# Patient Record
Sex: MALE | Race: WHITE | NOT HISPANIC OR LATINO | URBAN - METROPOLITAN AREA
[De-identification: names, ages, dates, MRNs, and addresses within clinical notes are randomized per-mention and may not be internally consistent; named-entity substitution may affect disease eponyms.]

---

## 2018-04-29 ENCOUNTER — EMERGENCY (EMERGENCY)
Facility: HOSPITAL | Age: 43
LOS: 1 days | Discharge: HOME | End: 2018-04-29
Admitting: EMERGENCY MEDICINE
Payer: OTHER MISCELLANEOUS

## 2018-04-29 VITALS
OXYGEN SATURATION: 96 % | RESPIRATION RATE: 16 BRPM | HEART RATE: 93 BPM | SYSTOLIC BLOOD PRESSURE: 121 MMHG | DIASTOLIC BLOOD PRESSURE: 85 MMHG | TEMPERATURE: 98 F

## 2018-04-29 DIAGNOSIS — E78.5 HYPERLIPIDEMIA, UNSPECIFIED: ICD-10-CM
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
## 2018-04-29 DIAGNOSIS — I10 ESSENTIAL (PRIMARY) HYPERTENSION: ICD-10-CM
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
## 2018-04-29 DIAGNOSIS — S83.91XA SPRAIN OF UNSPECIFIED SITE OF RIGHT KNEE, INITIAL ENCOUNTER: ICD-10-CM

## 2018-04-29 DIAGNOSIS — S83.92XA SPRAIN OF UNSPECIFIED SITE OF LEFT KNEE, INITIAL ENCOUNTER: ICD-10-CM
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
## 2018-04-29 DIAGNOSIS — J45.909 UNSPECIFIED ASTHMA, UNCOMPLICATED: ICD-10-CM

## 2018-04-29 DIAGNOSIS — M25.561 PAIN IN RIGHT KNEE: ICD-10-CM

## 2018-04-29 DIAGNOSIS — Z98.890 OTHER SPECIFIED POSTPROCEDURAL STATES: Chronic | ICD-10-CM

## 2018-04-29 PROCEDURE — 99283 EMERGENCY DEPT VISIT LOW MDM: CPT | Mod: 25

## 2018-04-29 PROCEDURE — 73562 X-RAY EXAM OF KNEE 3: CPT | Mod: 26,50

## 2018-04-29 RX ORDER — IBUPROFEN 200 MG
1 TABLET ORAL
Qty: 20 | Refills: 0
Start: 2018-04-29 | End: 2018-05-28

## 2018-04-29 RX ORDER — IBUPROFEN 200 MG
800 TABLET ORAL ONCE
Refills: 0 | Status: COMPLETED | OUTPATIENT
Start: 2018-04-29 | End: 2018-04-29

## 2018-04-29 RX ORDER — OXYCODONE AND ACETAMINOPHEN 5; 325 MG/1; MG/1
1 TABLET ORAL ONCE
Refills: 0 | Status: DISCONTINUED | OUTPATIENT
Start: 2018-04-29 | End: 2018-04-29

## 2018-04-29 RX ADMIN — OXYCODONE AND ACETAMINOPHEN 1 TABLET(S): 5; 325 TABLET ORAL at 19:25

## 2018-04-29 RX ADMIN — Medication 800 MILLIGRAM(S): at 18:52

## 2018-04-29 NOTE — ED PROVIDER NOTE - DIAGNOSTIC INTERPRETATION
Xray (wet reads) interpreted by LYNSEY RECINOS   Xray knees 3 views - +soft tissue swelling. no acute fx or dislocation, joint space intact, no effusion noted. No foreign body noted Xray (wet reads) interpreted by LYNSEY RECINOS   Xray knees 3 views - +soft tissue swelling with slight uneven displacement of the patella on the R. no acute fx or dislocation, joint space intact, no effusion noted. No foreign body noted

## 2018-04-29 NOTE — ED PROVIDER NOTE - PHYSICAL EXAMINATION
Gen - WDWN, NAD, comfortable and non-toxic appearing  Skin - warm, dry, intact   CV - S1S2, R/R/R  Resp - CTAB, no r/r/w   MS - w/w/p, R knee + minimal edema and TTP medially. L knee with TTP over the patellar and medial region, no erythema, ecchymosis, crepitus, joint laxity, or deformity, restricted ROM 2/2 pain, NV intact. no midline tenderness, pelvis and hips stable and NT   Neuro - AxOx3, ambulatory with mild limp

## 2018-04-29 NOTE — ED PROVIDER NOTE - OBJECTIVE STATEMENT
42 yo M with PMHx of HTN, HLD, GERD, kidney stones, and asthma presenting c/o b/l knee pain s/p work injury.  Pt is coming down on a train track, slipped and banged his L knee against the pole and twisted R knee.  Now with pain in both knees and difficulty with ambulation.  Denies head trauma, LOC, break in the skin, paresthesia, numbness, tingling, redness, bleeding, d/c, HA, dizziness, SOB, CP, palpitations, N/V, focal weakness, neck/back pain, and malaise. Pt is ambulatory s/p fall

## 2020-01-16 ENCOUNTER — EMERGENCY (EMERGENCY)
Facility: HOSPITAL | Age: 45
LOS: 1 days | Discharge: HOME | End: 2020-01-16
Attending: EMERGENCY MEDICINE | Admitting: EMERGENCY MEDICINE
Payer: COMMERCIAL

## 2020-01-16 VITALS
TEMPERATURE: 98 F | RESPIRATION RATE: 20 BRPM | SYSTOLIC BLOOD PRESSURE: 112 MMHG | HEART RATE: 82 BPM | OXYGEN SATURATION: 97 % | DIASTOLIC BLOOD PRESSURE: 72 MMHG

## 2020-01-16 VITALS
WEIGHT: 289.91 LBS | HEIGHT: 71 IN | SYSTOLIC BLOOD PRESSURE: 140 MMHG | RESPIRATION RATE: 20 BRPM | OXYGEN SATURATION: 97 % | HEART RATE: 103 BPM | TEMPERATURE: 98 F | DIASTOLIC BLOOD PRESSURE: 90 MMHG

## 2020-01-16 DIAGNOSIS — Z98.890 OTHER SPECIFIED POSTPROCEDURAL STATES: Chronic | ICD-10-CM

## 2020-01-16 LAB
ALBUMIN SERPL ELPH-MCNC: 3.6 G/DL — SIGNIFICANT CHANGE UP (ref 3.4–5)
ALP SERPL-CCNC: 99 U/L — SIGNIFICANT CHANGE UP (ref 40–120)
ALT FLD-CCNC: 25 U/L — SIGNIFICANT CHANGE UP (ref 12–42)
AMPHET UR-MCNC: NEGATIVE — SIGNIFICANT CHANGE UP
ANION GAP SERPL CALC-SCNC: 6 MMOL/L — LOW (ref 9–16)
APPEARANCE UR: CLEAR — SIGNIFICANT CHANGE UP
APTT BLD: 33.8 SEC — SIGNIFICANT CHANGE UP (ref 27.5–36.3)
AST SERPL-CCNC: 19 U/L — SIGNIFICANT CHANGE UP (ref 15–37)
BARBITURATES UR SCN-MCNC: POSITIVE — SIGNIFICANT CHANGE UP
BASOPHILS # BLD AUTO: 0.08 K/UL — SIGNIFICANT CHANGE UP (ref 0–0.2)
BASOPHILS NFR BLD AUTO: 1.1 % — SIGNIFICANT CHANGE UP (ref 0–2)
BENZODIAZ UR-MCNC: POSITIVE
BILIRUB SERPL-MCNC: 0.3 MG/DL — SIGNIFICANT CHANGE UP (ref 0.2–1.2)
BILIRUB UR-MCNC: NEGATIVE — SIGNIFICANT CHANGE UP
BUN SERPL-MCNC: 28 MG/DL — HIGH (ref 7–23)
CALCIUM SERPL-MCNC: 8.6 MG/DL — SIGNIFICANT CHANGE UP (ref 8.5–10.5)
CHLORIDE SERPL-SCNC: 106 MMOL/L — SIGNIFICANT CHANGE UP (ref 96–108)
CO2 SERPL-SCNC: 28 MMOL/L — SIGNIFICANT CHANGE UP (ref 22–31)
COCAINE METAB.OTHER UR-MCNC: POSITIVE
COLOR SPEC: YELLOW — SIGNIFICANT CHANGE UP
CREAT SERPL-MCNC: 1.01 MG/DL — SIGNIFICANT CHANGE UP (ref 0.5–1.3)
D DIMER BLD IA.RAPID-MCNC: <187 NG/ML DDU — SIGNIFICANT CHANGE UP
DIFF PNL FLD: NEGATIVE — SIGNIFICANT CHANGE UP
EOSINOPHIL # BLD AUTO: 0.36 K/UL — SIGNIFICANT CHANGE UP (ref 0–0.5)
EOSINOPHIL NFR BLD AUTO: 5.2 % — SIGNIFICANT CHANGE UP (ref 0–6)
GLUCOSE SERPL-MCNC: 97 MG/DL — SIGNIFICANT CHANGE UP (ref 70–99)
GLUCOSE UR QL: NEGATIVE — SIGNIFICANT CHANGE UP
HCT VFR BLD CALC: 42.8 % — SIGNIFICANT CHANGE UP (ref 39–50)
HGB BLD-MCNC: 14.3 G/DL — SIGNIFICANT CHANGE UP (ref 13–17)
IMM GRANULOCYTES NFR BLD AUTO: 0.3 % — SIGNIFICANT CHANGE UP (ref 0–1.5)
INR BLD: 1.01 — SIGNIFICANT CHANGE UP (ref 0.88–1.16)
KETONES UR-MCNC: ABNORMAL MG/DL
LEUKOCYTE ESTERASE UR-ACNC: NEGATIVE — SIGNIFICANT CHANGE UP
LIDOCAIN IGE QN: 72 U/L — LOW (ref 73–393)
LYMPHOCYTES # BLD AUTO: 1.2 K/UL — SIGNIFICANT CHANGE UP (ref 1–3.3)
LYMPHOCYTES # BLD AUTO: 17.2 % — SIGNIFICANT CHANGE UP (ref 13–44)
MCHC RBC-ENTMCNC: 29.5 PG — SIGNIFICANT CHANGE UP (ref 27–34)
MCHC RBC-ENTMCNC: 33.4 GM/DL — SIGNIFICANT CHANGE UP (ref 32–36)
MCV RBC AUTO: 88.2 FL — SIGNIFICANT CHANGE UP (ref 80–100)
METHADONE UR-MCNC: NEGATIVE — SIGNIFICANT CHANGE UP
MONOCYTES # BLD AUTO: 0.71 K/UL — SIGNIFICANT CHANGE UP (ref 0–0.9)
MONOCYTES NFR BLD AUTO: 10.2 % — SIGNIFICANT CHANGE UP (ref 2–14)
NEUTROPHILS # BLD AUTO: 4.6 K/UL — SIGNIFICANT CHANGE UP (ref 1.8–7.4)
NEUTROPHILS NFR BLD AUTO: 66 % — SIGNIFICANT CHANGE UP (ref 43–77)
NITRITE UR-MCNC: NEGATIVE — SIGNIFICANT CHANGE UP
NRBC # BLD: 0 /100 WBCS — SIGNIFICANT CHANGE UP (ref 0–0)
NT-PROBNP SERPL-SCNC: 48 PG/ML — SIGNIFICANT CHANGE UP
OPIATES UR-MCNC: NEGATIVE — SIGNIFICANT CHANGE UP
PCP SPEC-MCNC: SIGNIFICANT CHANGE UP
PCP UR-MCNC: NEGATIVE — SIGNIFICANT CHANGE UP
PH UR: 7.5 — SIGNIFICANT CHANGE UP (ref 5–8)
PLATELET # BLD AUTO: 205 K/UL — SIGNIFICANT CHANGE UP (ref 150–400)
POTASSIUM SERPL-MCNC: 4.1 MMOL/L — SIGNIFICANT CHANGE UP (ref 3.5–5.3)
POTASSIUM SERPL-SCNC: 4.1 MMOL/L — SIGNIFICANT CHANGE UP (ref 3.5–5.3)
PROT SERPL-MCNC: 6.6 G/DL — SIGNIFICANT CHANGE UP (ref 6.4–8.2)
PROT UR-MCNC: NEGATIVE MG/DL — SIGNIFICANT CHANGE UP
PROTHROM AB SERPL-ACNC: 11.2 SEC — SIGNIFICANT CHANGE UP (ref 10–12.9)
RBC # BLD: 4.85 M/UL — SIGNIFICANT CHANGE UP (ref 4.2–5.8)
RBC # FLD: 13.2 % — SIGNIFICANT CHANGE UP (ref 10.3–14.5)
SODIUM SERPL-SCNC: 140 MMOL/L — SIGNIFICANT CHANGE UP (ref 132–145)
SP GR SPEC: 1.02 — SIGNIFICANT CHANGE UP (ref 1–1.03)
THC UR QL: NEGATIVE — SIGNIFICANT CHANGE UP
TROPONIN I SERPL-MCNC: <0.017 NG/ML — LOW (ref 0.02–0.06)
TROPONIN I SERPL-MCNC: <0.017 NG/ML — LOW (ref 0.02–0.06)
UROBILINOGEN FLD QL: 0.2 E.U./DL — SIGNIFICANT CHANGE UP
WBC # BLD: 6.97 K/UL — SIGNIFICANT CHANGE UP (ref 3.8–10.5)
WBC # FLD AUTO: 6.97 K/UL — SIGNIFICANT CHANGE UP (ref 3.8–10.5)

## 2020-01-16 PROCEDURE — 99285 EMERGENCY DEPT VISIT HI MDM: CPT | Mod: 25

## 2020-01-16 PROCEDURE — 71046 X-RAY EXAM CHEST 2 VIEWS: CPT | Mod: 26

## 2020-01-16 PROCEDURE — 99284 EMERGENCY DEPT VISIT MOD MDM: CPT

## 2020-01-16 PROCEDURE — 93010 ELECTROCARDIOGRAM REPORT: CPT

## 2020-01-16 RX ORDER — ASPIRIN/CALCIUM CARB/MAGNESIUM 324 MG
162 TABLET ORAL ONCE
Refills: 0 | Status: DISCONTINUED | OUTPATIENT
Start: 2020-01-16 | End: 2020-01-16

## 2020-01-16 RX ORDER — ASPIRIN/CALCIUM CARB/MAGNESIUM 324 MG
162 TABLET ORAL ONCE
Refills: 0 | Status: COMPLETED | OUTPATIENT
Start: 2020-01-16 | End: 2020-01-16

## 2020-01-16 RX ADMIN — Medication 162 MILLIGRAM(S): at 10:33

## 2020-01-16 NOTE — ED PROVIDER NOTE - OBJECTIVE STATEMENT
44 y o male with PMHX of asthma (Singulair, Allegra, Flovent rescue inhaler), HLD (Pravastatin), non smoker, presents to ED c/o two episodes of non radiating substernal chest pain and flushed sensation this morning. The first episode happened while sitting on the bus during his commute at 5:30 am, and resolved about 45 minutes later. The second episode happened after eating breakfast at work and resolved about 15 minutes later, with associated nausea. Chest pressure described as "if there was an elephant sitting on my chest" with associated SOB noted with heavy breathing. States he took a baby Aspirin PTA. Pt denies current chest pain or SOB. States that similar periods of disrupted breathing have woken him up in the past. Pt has hx of ENT surgery for sleep apnea in 2011. Father has hx of MI at 42 y o. No vomiting, palpitations, syncope, abd pain, back pain, edema, or other complaints.

## 2020-01-16 NOTE — ED PROVIDER NOTE - PROGRESS NOTE DETAILS
initial workup is unremarkable including Dimer and troponin. Will hold for second troponin, cardiac monitoring, and consult cardiology. normal trop x 2, normal EKG, asymptomatic in ED. Will DC w Cardio f/u.

## 2020-01-16 NOTE — CONSULT NOTE ADULT - SUBJECTIVE AND OBJECTIVE BOX
[facility] Cardiology Consultation    CHIEF COMPLAINT: CP    HISTORY OF PRESENT ILLNESS: 43 y/o male with history of HLD and KYLIE s/p surgery presented with chest discomfort and fatigue noted this morning. He remarks on symptoms at rest and 45 min or so in duration. Patient hypertensive on arrival.     PAST MEDICAL & SURGICAL HISTORY:  Asthma  GERD (gastroesophageal reflux disease)  HLD (hyperlipidemia)  HTN (hypertension)  H/O hernia repair        Allergies Percocet 10/325 (Unknown)    MEDICATIONS:  pravastatin    FAMILY HISTORY: no CAD    SOCIAL HISTORY:  no EtOH, tobacco or drug use    REVIEW OF SYSTEMS:  CONSTITUTIONAL: No fever, weight loss, or fatigue  EYES: No eye pain, visual disturbances, or discharge  ENMT:  No difficulty hearing, tinnitus, vertigo; No sinus or throat pain  NECK: No pain or stiffness  BREASTS: No pain, masses, or nipple discharge  RESPIRATORY: No cough, wheezing, chills or hemoptysis; No Shortness of Breath  CARDIOVASCULAR: No chest pain, palpitations, dizziness, or leg swelling  GASTROINTESTINAL: No abdominal or epigastric pain. No nausea, vomiting, or hematemesis; No diarrhea or constipation. No melena or hematochezia.  GENITOURINARY: No dysuria, frequency, hematuria, or incontinence  NEUROLOGICAL: No headaches, memory loss, loss of strength, numbness, or tremors  SKIN: No itching, burning, rashes, or lesions   LYMPH Nodes: No enlarged glands  ENDOCRINE: No heat or cold intolerance; No hair loss  MUSCULOSKELETAL: No joint pain or swelling; No muscle, back, or extremity pain  PSYCHIATRIC: No depression, anxiety, mood swings, or difficulty sleeping  HEME/LYMPH: No easy bruising, or bleeding gums  ALLERY AND IMMUNOLOGIC: No hives or eczema	      PHYSICAL EXAM:  T(C): 36.8 (01-16-20 @ 13:49), Max: 36.8 (01-16-20 @ 13:49)  HR: 82 (01-16-20 @ 13:49) (82 - 103)  BP: 112/72 (01-16-20 @ 13:49) (112/72 - 147/73)  RR: 20 (01-16-20 @ 13:49) (18 - 20)  SpO2: 97% (01-16-20 @ 13:49) (96% - 97%)  Appearance: n  HEENT:   Normal oral mucosa, PERRL, EOMI	  Lymphatic: No lymphadenopathy  Cardiovascular: Normal S1 S2, No JVD, No murmurs, No edema  Respiratory: Lungs clear to auscultation	  Psychiatry: A & O x 3, Mood & affect appropriate  Gastrointestinal:  Soft, Non-tender, + BS	  Skin: No rashes, No ecchymoses, No cyanosis	  Neurologic: Non-focal  Extremities: Normal range of motion, No clubbing, cyanosis or edema  Vascular: Peripheral pulses palpable 2+ bilaterally  	    ECG:  	sr no st-t changes     LABS:	 	  CARDIAC MARKERS:  Troponin I, Serum: <0.017 ng/mL (01-16 @ 10:31)                                  14.3   6.97  )-----------( 205      ( 16 Jan 2020 10:31 )             42.8     01-16    140  |  106  |  28<H>  ----------------------------<  97  4.1   |  28  |  1.01    Ca    8.6      16 Jan 2020 10:31    TPro  6.6  /  Alb  3.6  /  TBili  0.3  /  DBili  x   /  AST  19  /  ALT  25  /  AlkPhos  99  01-16    proBNP: Serum Pro-Brain Natriuretic Peptide: 48 pg/mL (01-16 @ 10:31)    ASSESSMENT/PLAN: 	43 y/o man with chest discomfort  1. patient seen and examined, chart reviewed  2. CE x 2  3. monitor b/p  4. will see in the office for a follow up and a possible echo  5. patient can also benefit from pulm medicine follow up    I spent 45 min in care of this patient

## 2020-01-16 NOTE — ED PROVIDER NOTE - CLINICAL SUMMARY MEDICAL DECISION MAKING FREE TEXT BOX
Complete full aspirin dose. Lab work ordered including cardiac enzymes. CXR and EKG ordered. Several risk factors taken into consideration including PMHx and FHx. Reassess.

## 2020-01-16 NOTE — ED PROVIDER NOTE - PATIENT PORTAL LINK FT
You can access the FollowMyHealth Patient Portal offered by [facility] by registering at the following website: [email or web address]. By joining [facility]’s FollowMyHealth portal, you will also be able to view your health information using other applications (apps) compatible with our system.

## 2020-01-16 NOTE — ED PROVIDER NOTE - DIAGNOSTIC INTERPRETATION
Interpreted by ED physician: DR. العراقي  Chest x-ray, 2 views  Lungs clear, heart shadow normal, bony structures normal, no free air under diaphragm, no PTX

## 2020-01-16 NOTE — ED PROVIDER NOTE - CARE PROVIDER_API CALL
Ugo Ortez)  Cardiovascular Disease  [address] [place] [address]  Phone: [phone]  Fax: [phone]  Follow Up Time:

## 2020-01-21 PROBLEM — Z00.00 ENCOUNTER FOR PREVENTIVE HEALTH EXAMINATION: Status: ACTIVE | Noted: 2020-01-21

## 2020-01-21 PROBLEM — Z00.00 ENCOUNTER FOR PREVENTIVE HEALTH EXAMINATION: Noted: 2020-01-21

## 2020-01-22 ENCOUNTER — APPOINTMENT (OUTPATIENT)
Dept: HEART AND VASCULAR | Facility: CLINIC | Age: 45
End: 2020-01-22
Payer: COMMERCIAL

## 2020-01-22 ENCOUNTER — APPOINTMENT (OUTPATIENT)
Dept: OTOLARYNGOLOGY | Facility: CLINIC | Age: 45
End: 2020-01-22
Payer: COMMERCIAL

## 2020-01-22 VITALS
BODY MASS INDEX: 41.44 KG/M2 | DIASTOLIC BLOOD PRESSURE: 85 MMHG | HEIGHT: 71 IN | WEIGHT: 296 LBS | TEMPERATURE: 99 F | OXYGEN SATURATION: 95 % | HEART RATE: 83 BPM | SYSTOLIC BLOOD PRESSURE: 123 MMHG

## 2020-01-22 VITALS
SYSTOLIC BLOOD PRESSURE: 120 MMHG | HEIGHT: 71 IN | DIASTOLIC BLOOD PRESSURE: 66 MMHG | BODY MASS INDEX: 41.44 KG/M2 | OXYGEN SATURATION: 96 % | HEART RATE: 85 BPM | WEIGHT: 296 LBS

## 2020-01-22 DIAGNOSIS — Z85.9 PERSONAL HISTORY OF MALIGNANT NEOPLASM, UNSPECIFIED: ICD-10-CM

## 2020-01-22 PROCEDURE — 99214 OFFICE O/P EST MOD 30 MIN: CPT

## 2020-01-22 PROCEDURE — 31575 DIAGNOSTIC LARYNGOSCOPY: CPT

## 2020-01-22 PROCEDURE — 99204 OFFICE O/P NEW MOD 45 MIN: CPT | Mod: 25

## 2020-01-22 NOTE — ASSESSMENT
[FreeTextEntry1] : Reviewed reflux precautions and provided the patient with the corresponding educational handout.\par Discussed allergen mitigation and provided the patient with the corresponding educational handout; reviewed proper nasal steroid administration technique.\par Discussed a possible turbinoplasty; discussed a bariatrics referral.

## 2020-01-22 NOTE — HISTORY OF PRESENT ILLNESS
[de-identified] : s/p TORS (UPPP, septo) w/ Dr Ibarra in 2012 for KYLIE; he improved but didn’t have a postop exam. Now presents c/o ongoing snoring & nocturnal awakenings w/ lots of daytime sleepiness. No morning headaches, rx for htn or nasal dyspnea in general. \par Multiple allergies to dust, pollen, cats, and others. Takes allegra & singulair. Not sure if he's tried nasal steroids. Not on a steroid for his asthma. \par Lots of reflux for which he takes protonix daily. \par

## 2020-01-22 NOTE — PROCEDURE
[de-identified] : Indication: requirement for exam not possible via anterior examination; KYLIE workup\par After verbal consent and the administration of an aerosolized phenylephrine/lidocaine mix, examination was performed with a flexible endoscope placed through the nose. Findings:\par Nasopharynx: unremarkable\par Soft palate, lateral and posterior pharyngeal walls: partially absent uvula; prominent narrowing of the lateral walls\par Base of tongue & lingual tonsil: minimal retrodisplacement\par Vallecula: unremarkable\par Epiglottis: unremarkable\par Piriform sinuses and pharyngoesophageal junction: unremarkable\par Arytenoids and AE folds: mod-severe interarytenoid edema\par Ventricle and false vocal folds: unremarkable\par True vocal folds: Smooth free edge; surface without ectasias or edema; normal movement bilaterally with good apposition in phonation\par Visible subglottis: unremarkable\par Other findings: none

## 2020-01-23 DIAGNOSIS — R07.89 OTHER CHEST PAIN: ICD-10-CM

## 2020-01-23 DIAGNOSIS — R06.02 SHORTNESS OF BREATH: ICD-10-CM

## 2020-01-23 RX ORDER — PANTOPRAZOLE SODIUM 40 MG/1
40 TABLET, DELAYED RELEASE ORAL
Refills: 0 | Status: ACTIVE | COMMUNITY

## 2020-01-23 RX ORDER — PRAVASTATIN SODIUM 10 MG/1
10 TABLET ORAL
Refills: 0 | Status: ACTIVE | COMMUNITY

## 2020-01-23 RX ORDER — MONTELUKAST SODIUM 10 MG/1
10 TABLET, FILM COATED ORAL
Refills: 0 | Status: ACTIVE | COMMUNITY

## 2020-01-23 NOTE — PHYSICAL EXAM
[General Appearance - Well Developed] : well developed [Normal Appearance] : normal appearance [Well Groomed] : well groomed [General Appearance - Well Nourished] : well nourished [No Deformities] : no deformities [General Appearance - In No Acute Distress] : no acute distress [Normal Conjunctiva] : the conjunctiva exhibited no abnormalities [Eyelids - No Xanthelasma] : the eyelids demonstrated no xanthelasmas [Normal Oral Mucosa] : normal oral mucosa [No Oral Pallor] : no oral pallor [No Oral Cyanosis] : no oral cyanosis [Normal Jugular Venous A Waves Present] : normal jugular venous A waves present [Normal Jugular Venous V Waves Present] : normal jugular venous V waves present [No Jugular Venous Linton A Waves] : no jugular venous linton A waves [Respiration, Rhythm And Depth] : normal respiratory rhythm and effort [Exaggerated Use Of Accessory Muscles For Inspiration] : no accessory muscle use [Auscultation Breath Sounds / Voice Sounds] : lungs were clear to auscultation bilaterally [Heart Rate And Rhythm] : heart rate and rhythm were normal [Heart Sounds] : normal S1 and S2 [Murmurs] : no murmurs present [Abdomen Soft] : soft [Abdomen Tenderness] : non-tender [Abdomen Mass (___ Cm)] : no abdominal mass palpated [Abnormal Walk] : normal gait [Gait - Sufficient For Exercise Testing] : the gait was sufficient for exercise testing [Nail Clubbing] : no clubbing of the fingernails [Cyanosis, Localized] : no localized cyanosis [Petechial Hemorrhages (___cm)] : no petechial hemorrhages [Skin Color & Pigmentation] : normal skin color and pigmentation [] : no rash [No Venous Stasis] : no venous stasis [Skin Lesions] : no skin lesions [No Skin Ulcers] : no skin ulcer [No Xanthoma] : no  xanthoma was observed [Oriented To Time, Place, And Person] : oriented to person, place, and time [Affect] : the affect was normal [Mood] : the mood was normal [No Anxiety] : not feeling anxious

## 2020-01-23 NOTE — REASON FOR VISIT
[Follow-Up - Clinic] : a clinic follow-up of [Chest Pain] : chest pain [FreeTextEntry1] : 44 y o male with PMHX of asthma (Singulair, Allegra,\par Flovent rescue inhaler), HLD (Pravastatin), non smoker, presents to ED c/o two\par episodes of non radiating substernal chest pain and flushed sensation this\par morning. The first episode happened while sitting on the bus during his commute\par at 5:30 am, and resolved about 45 minutes later. The second episode happened\par after eating breakfast at work and resolved about 15 minutes later, with\par associated nausea. Chest pressure described as "if there was an elephant\par sitting on my chest" with associated SOB noted with heavy breathing. States he\par took a baby Aspirin PTA. Pt denies current chest pain or SOB. States that\par similar periods of disrupted breathing have woken him up in the past. Pt has hx\par of ENT surgery for sleep apnea in 2011. Father has hx of MI at 42 y o. No\par vomiting, palpitations, syncope, abd pain, back pain, edema, or other

## 2020-01-23 NOTE — DISCUSSION/SUMMARY
[FreeTextEntry1] : CP FABIAN and I had a discussion of his symptoms. His risk for CAD falls intro intermediate. We will therefore move forward with a stress echo at this time to evaluate for obstructive CAD. Risks and benefits discussed.\par HLD FABIAN and I discussed his lipid panel and individualized target LDL goal. At this point, will do diet and exercise with anticipation of re-evaluating labs in 3-6 months\par HTN - FABIAN  and I had an extensive discussion regarding his blood pressure management. Patient will continue taking current medications in addition to maintaining a low Na diet, with periodic b/p checks at home.\par

## 2020-01-29 ENCOUNTER — APPOINTMENT (OUTPATIENT)
Dept: PULMONOLOGY | Facility: CLINIC | Age: 45
End: 2020-01-29
Payer: COMMERCIAL

## 2020-01-29 VITALS
HEIGHT: 71 IN | HEART RATE: 85 BPM | TEMPERATURE: 98.1 F | WEIGHT: 294 LBS | SYSTOLIC BLOOD PRESSURE: 105 MMHG | DIASTOLIC BLOOD PRESSURE: 67 MMHG | OXYGEN SATURATION: 96 % | BODY MASS INDEX: 41.16 KG/M2

## 2020-01-29 DIAGNOSIS — G47.33 OBSTRUCTIVE SLEEP APNEA (ADULT) (PEDIATRIC): ICD-10-CM

## 2020-01-29 DIAGNOSIS — E66.2 MORBID (SEVERE) OBESITY WITH ALVEOLAR HYPOVENTILATION: ICD-10-CM

## 2020-01-29 PROCEDURE — 99244 OFF/OP CNSLTJ NEW/EST MOD 40: CPT

## 2020-01-30 ENCOUNTER — TRANSCRIPTION ENCOUNTER (OUTPATIENT)
Age: 45
End: 2020-01-30

## 2020-01-31 NOTE — PHYSICAL EXAM
[General Appearance - Well Developed] : well developed [Normal Appearance] : normal appearance [Well Groomed] : well groomed [General Appearance - Well Nourished] : well nourished [General Appearance - In No Acute Distress] : no acute distress [No Deformities] : no deformities [Erythema] : erythema of the pharynx [Normal Conjunctiva] : the conjunctiva exhibited no abnormalities [IV] : IV [Neck Appearance] : the appearance of the neck was normal [Heart Rate And Rhythm] : heart rate was normal and rhythm regular [Apical Impulse] : the apical impulse was normal [Respiration, Rhythm And Depth] : normal respiratory rhythm and effort [Exaggerated Use Of Accessory Muscles For Inspiration] : no accessory muscle use [] : no respiratory distress [Abnormal Walk] : normal gait [Auscultation Breath Sounds / Voice Sounds] : lungs were clear to auscultation bilaterally [Involuntary Movements] : no involuntary movements were seen [Nail Clubbing] : no clubbing of the fingernails [Cyanosis, Localized] : no localized cyanosis [No Focal Deficits] : no focal deficits [Skin Color & Pigmentation] : normal skin color and pigmentation [Oriented To Time, Place, And Person] : oriented to person, place, and time [Impaired Insight] : insight and judgment were intact [Affect] : the affect was normal [Mood] : the mood was normal [FreeTextEntry1] : s/p UPPP

## 2020-01-31 NOTE — HISTORY OF PRESENT ILLNESS
[Obstructive Sleep Apnea] : obstructive sleep apnea [Snoring] : snoring [Witnessed Apneas] : witnessed sleep apnea [Daytime Somnolence] : daytime somnolence [Frequent Nocturnal Awakening] : frequent nocturnal awakening [ESS: ___] : ESS score [unfilled] [Unintentional Sleep While Inactive] : unintentional sleep while inactive [Recent  Weight Gain] : recent weight gain [Awakes Unrefreshed] : awakening unrefreshed [FreeTextEntry1] : 43 yo  man with KYLIE, s/p UPPP In  here for evaluation. Has been noticing increased gasping and chocking episodes whenever he falls asleep. Has been gaining weight. Tried CPAP before UPPP but could not tolerate the mask. He has poor eating habits. Has severe GERD. Father  at 42 from an MI so he is worried about his health.  [DIS] : no DIS [DMS] : no DMS [Unusual Sleep Behavior] : no unusual sleep behavior [Hypersomnolence] : no hypersomnolence [Cataplexy] : no cataplexy [Sleep Paralysis] : no sleep paralysis [Hypnagogic Hallucinations] : no hallucinations when falling asleep [Hypnopompic Hallucinations] : no hallucinations when awakening

## 2020-01-31 NOTE — REVIEW OF SYSTEMS
[EDS: ESS=____] : daytime somnolence: ESS=[unfilled] [Obesity] : obesity [Negative] : Musculoskeletal [Difficulty Initiating Sleep] : no difficulty falling asleep [Lower Extremity Discomfort] : no lower extremity discomfort [Late day/ Evening symptoms] : no late day/evening symptoms [Unusual Sleep Behavior] : no unusual sleep behavior [Hypersomnolence] : not sleeping much more than usual [Cataplexy] :  no cataplexy

## 2020-01-31 NOTE — CONSULT LETTER
[Consult Letter:] : I had the pleasure of evaluating your patient, [unfilled]. [Dear  ___] : Dear  [unfilled], [Sincerely,] : Sincerely, [Please see my note below.] : Please see my note below. [Consult Closing:] : Thank you very much for allowing me to participate in the care of this patient.  If you have any questions, please do not hesitate to contact me. [FreeTextEntry3] : Evelin Arnold MD\par \par [employer] [facility]\par Pulmonary, Critical Care, and Sleep Medicine\par

## 2020-01-31 NOTE — ASSESSMENT
[FreeTextEntry1] : 45 yo with hx of KYLIE s/p UPPP now with recurrent symptoms. Referred by Dr. Ibrahim. He is endorsing excessive sleepiness, snoring, apneas. His BMI is 41 kg/m2. There is concern for obesity hypoventilation syndrome and KYLIE. UPPP has not been shown to be effective for KYLIE treatment. All available treatment options, including mandibular advancement therapy, PAP therapy and hypoglossal nerve stimulation were discussed in detail. We will start with an attended PSG with TCO2 monitoring, and make further management decisions after the results. The ramifications of KYLIE/OHS and its treatment were discussed. Weight loss counseling provided; referral for bariatric surgery provided. Follow up after the PSG.

## 2020-02-05 ENCOUNTER — APPOINTMENT (OUTPATIENT)
Dept: GASTROENTEROLOGY | Facility: CLINIC | Age: 45
End: 2020-02-05
Payer: COMMERCIAL

## 2020-02-05 VITALS
OXYGEN SATURATION: 96 % | TEMPERATURE: 99.1 F | HEIGHT: 71 IN | WEIGHT: 296 LBS | DIASTOLIC BLOOD PRESSURE: 82 MMHG | SYSTOLIC BLOOD PRESSURE: 125 MMHG | BODY MASS INDEX: 41.44 KG/M2 | HEART RATE: 85 BPM

## 2020-02-05 DIAGNOSIS — K21.9 GASTRO-ESOPHAGEAL REFLUX DISEASE W/OUT ESOPHAGITIS: ICD-10-CM

## 2020-02-05 PROCEDURE — 99204 OFFICE O/P NEW MOD 45 MIN: CPT

## 2020-02-26 PROBLEM — J45.909 UNSPECIFIED ASTHMA, UNCOMPLICATED: Chronic | Status: ACTIVE | Noted: 2018-04-29

## 2020-02-26 PROBLEM — I10 ESSENTIAL (PRIMARY) HYPERTENSION: Chronic | Status: ACTIVE | Noted: 2018-04-29

## 2020-02-26 PROBLEM — K21.9 GASTRO-ESOPHAGEAL REFLUX DISEASE WITHOUT ESOPHAGITIS: Chronic | Status: ACTIVE | Noted: 2018-04-29

## 2020-02-26 PROBLEM — E78.5 HYPERLIPIDEMIA, UNSPECIFIED: Chronic | Status: ACTIVE | Noted: 2018-04-29

## 2020-02-27 ENCOUNTER — APPOINTMENT (OUTPATIENT)
Dept: OTOLARYNGOLOGY | Facility: CLINIC | Age: 45
End: 2020-02-27
Payer: COMMERCIAL

## 2020-02-27 VITALS
SYSTOLIC BLOOD PRESSURE: 130 MMHG | HEIGHT: 71 IN | DIASTOLIC BLOOD PRESSURE: 84 MMHG | HEART RATE: 86 BPM | TEMPERATURE: 97.7 F | WEIGHT: 296 LBS | BODY MASS INDEX: 41.44 KG/M2 | OXYGEN SATURATION: 95 %

## 2020-02-27 DIAGNOSIS — J34.3 HYPERTROPHY OF NASAL TURBINATES: ICD-10-CM

## 2020-02-27 DIAGNOSIS — J30.89 OTHER ALLERGIC RHINITIS: ICD-10-CM

## 2020-02-27 DIAGNOSIS — K21.9 GASTRO-ESOPHAGEAL REFLUX DISEASE W/OUT ESOPHAGITIS: ICD-10-CM

## 2020-02-27 PROCEDURE — 99214 OFFICE O/P EST MOD 30 MIN: CPT

## 2020-02-27 NOTE — PHYSICAL EXAM
[FreeTextEntry1] : obese [] : septum deviated to the right [de-identified] : large ITs bilat [Removed] : palatine tonsils previously removed [de-identified] : partially absent uvula [Normal] : no rashes

## 2020-02-27 NOTE — ASSESSMENT
[FreeTextEntry1] : Again discussed bariatric surgery & recommended that he d/w Dr. Salvador. \par Encouraged him to continue weight loss & pursue the PSG.\par Reinforced behavioral modification as previously discussed.\par

## 2020-02-27 NOTE — HISTORY OF PRESENT ILLNESS
[de-identified] : s/p TORS (UPPP, septo) w/ Dr Ibarra in 2012 for KYLIE; he has lost 5 pounds and has seen Dr. Arnold with a PSG planned. Ongoing snoring & nocturnal awakenings w/ lots of daytime sleepiness. No morning headaches, rx for htn or nasal dyspnea in general. \par Multiple allergies to dust, pollen, cats, and others. Takes allegra & singulair. Much improved on flonase since his last visit. Not on a steroid for his asthma. \par Lots of reflux for which he takes famotidine; saw Dr. Valencia.

## 2020-03-04 ENCOUNTER — APPOINTMENT (OUTPATIENT)
Dept: ORTHOPEDIC SURGERY | Facility: CLINIC | Age: 45
End: 2020-03-04
Payer: COMMERCIAL

## 2020-03-04 VITALS — HEIGHT: 71 IN | WEIGHT: 296 LBS | BODY MASS INDEX: 41.44 KG/M2

## 2020-03-04 DIAGNOSIS — S46.012A STRAIN OF MUSCLE(S) AND TENDON(S) OF THE ROTATOR CUFF OF LEFT SHOULDER, INITIAL ENCOUNTER: ICD-10-CM

## 2020-03-04 DIAGNOSIS — M25.512 PAIN IN LEFT SHOULDER: ICD-10-CM

## 2020-03-04 DIAGNOSIS — M75.112 INCOMPLETE ROTATOR CUFF TEAR OR RUPTURE OF LEFT SHOULDER, NOT SPECIFIED AS TRAUMATIC: ICD-10-CM

## 2020-03-04 DIAGNOSIS — Z87.09 PERSONAL HISTORY OF OTHER DISEASES OF THE RESPIRATORY SYSTEM: ICD-10-CM

## 2020-03-04 PROCEDURE — 20611 DRAIN/INJ JOINT/BURSA W/US: CPT | Mod: LT

## 2020-03-04 PROCEDURE — 99213 OFFICE O/P EST LOW 20 MIN: CPT | Mod: 25

## 2020-03-04 NOTE — HISTORY OF PRESENT ILLNESS
[de-identified] : LEFT SHOULDER\par 2-3 MONTHS (WORKING OUT[employer]), WORSE IN THE LAST 2-3 DAYS\par PAIN LEVEL 8/10\par CONSTANT, INTERMITTENT\par SHARP, ACHY, BURNING, SHOOTING, STABBING\par BETTER WITH REST AND MEDICATION (SOMETIMES)\par WORSE WITH OVER USE\par

## 2020-03-04 NOTE — PHYSICAL EXAM
[de-identified] : PHYSICAL EXAM LEFT  SHOULDER\par \par SCAPULAR PROTRACTION\par AROM 140 / 140 / 80 / 10\par TENDER: SA REGION \par \par SPECIAL TESTING :\par HUGHES - POSITIVE \par VICKIE - POSITIVE \par SPEED TEST - POSITIVE\par \par ROMAN - NEGATIVE \par APPREHENSION AND SUPPRESSION - NEGATIVE \par \par RC STRENGTH TESTING \par SS:  5/5\par SUB 5/5\par IS     5/5\par BICEPS  5/5\par \par SENSATION  - GROSSLY INTACT\par \par \par

## 2020-03-04 NOTE — DISCUSSION/SUMMARY
[de-identified] : POST INJECTION INSTRUCTIONS\par \par COLD THERAPY , ANALGESICS PRN\par \par HOME STRETCHING AND EXERCISES QD\par \par START P.T.  2 WEEKS AFTER INJECTION \par \par CONSIDER ARTHROSCOPIC TREATMENT IF SYMPTOMS WORSEN OR ARE LIMITING

## 2020-03-04 NOTE — PROCEDURE
[de-identified] : INJECTION LEFT SHOULDER SA SPACE\par \par Patient has demonstrated limited relief from NSAIDS, rest, exercises / PT, and after discussion of the risks and benefits, the patient has elected to proceed with an ULTRASOUND GUIDED injection into the LEFT SUBACROMIAL  SPACE LATERAL APPROACH \par  \par Confirmed that the patient does not have history of prior adverse reactions, active, infections, or relevant allergies. There was no effusion, erythema, or warmth, and the skin was clear\par \par The skin was sterilized with alcohol. Ethyl Chloride was used as a topical anesthetic. Routine sterile technique. \par The site was injected UTILIZING ULTRASOUND GUIDANCE to confirm appropriate placement of the needle-\par with a mixture of medication and local anesthetic. The injection was completed without complication and a bandage was applied.\par  \par The patient tolerated the procedure well and was given post-injection instructions.Rec: Cold therapy, analgesics, avoid heavy activity.\par MEDICATION: 4cc of 1% xylocaine + 10mg of KENALOG\par \par

## 2020-03-12 ENCOUNTER — APPOINTMENT (OUTPATIENT)
Dept: HEART AND VASCULAR | Facility: CLINIC | Age: 45
End: 2020-03-12
Payer: COMMERCIAL

## 2020-03-12 VITALS
TEMPERATURE: 98 F | OXYGEN SATURATION: 95 % | HEART RATE: 94 BPM | HEIGHT: 71 IN | SYSTOLIC BLOOD PRESSURE: 131 MMHG | BODY MASS INDEX: 41.44 KG/M2 | WEIGHT: 296 LBS | DIASTOLIC BLOOD PRESSURE: 80 MMHG

## 2020-03-12 PROCEDURE — 93351 STRESS TTE COMPLETE: CPT

## 2020-03-12 PROCEDURE — 99214 OFFICE O/P EST MOD 30 MIN: CPT

## 2020-03-12 RX ORDER — MELOXICAM 15 MG/1
15 TABLET ORAL
Qty: 30 | Refills: 0 | Status: ACTIVE | COMMUNITY
Start: 2020-03-04

## 2020-03-12 NOTE — HISTORY OF PRESENT ILLNESS
[Preoperative Visit] : for a medical evaluation prior to surgery [Scheduled Procedure ___] : a [unfilled] [Date of Surgery ___] : on [unfilled] [Surgeon Name ___] : surgeon: [unfilled] [Good] : Good [Fever] : no fever [Chills] : no chills [Fatigue] : no fatigue [Chest Pain] : no chest pain [Cough] : no cough [Dyspnea] : no dyspnea [Dysuria] : no dysuria [Urinary Frequency] : no urinary frequency [Nausea] : no nausea [Vomiting] : no vomiting [Diarrhea] : no diarrhea [Abdominal Pain] : no abdominal pain [Easy Bruising] : no easy bruising [Diabetes] : no diabetes [Cardiovascular Disease] : no cardiovascular disease [Pulmonary Disease] : no pulmonary disease [Anti-Platelet Agents] : no anti-platelet agents [Nicotine Dependence] : no nicotine dependence [Alcohol Use] : no  alcohol use [Renal Disease] : no renal disease [GI Disease] : no gastrointestinal disease [Sleep Apnea] : no sleep apnea [Thromboembolic Problems] : no thromboembolic problems [Frequent use of NSAIDs] : no use of NSAIDs [Transfusion Reaction] : no transfusion reaction [Impaired Immunity] : no impaired immunity [Steroid Use in Last 6 Months] : no steroid use in the last six months [Frequent Aspirin Use] : no frequent aspirin use [Prior Anesthesia] : Prior anesthesia [Prev Anesthesia Reaction] : no previous anesthesia reaction [Electrocardiogram] : ~T an ECG ~C was performed [Cardiovascular Stress Test] : a cardiac stress test ~T ~C was performed [Metabolic Capacity ___Mets%] : The patient has a metabolic capacity of [unfilled] Mets%  [Fair] : Fair [Anesthesia Reaction] : no anesthesia reaction [Sudden Death] : no sudden death [Clotting Disorder] : no clotting disorder [Bleeding Disorder] : no bleeding disorder [de-identified] : Dr Da Silva [FreeTextEntry1] : Mr. DELGADO presents for a follow up today. He denies chest pain, dyspnea or palpitations. No issues reported with his medications. Otherwise, he is feeling well.\par \par Completed a stress echo today. The study is most remarkable for reduce exercise quality. However, no signs of heart disease noted.

## 2020-03-12 NOTE — HISTORY OF PRESENT ILLNESS
[Preoperative Visit] : for a medical evaluation prior to surgery [Scheduled Procedure ___] : a [unfilled] [Date of Surgery ___] : on [unfilled] [Surgeon Name ___] : surgeon: [unfilled] [Good] : Good [Fever] : no fever [Chills] : no chills [Fatigue] : no fatigue [Chest Pain] : no chest pain [Cough] : no cough [Dyspnea] : no dyspnea [Dysuria] : no dysuria [Urinary Frequency] : no urinary frequency [Nausea] : no nausea [Vomiting] : no vomiting [Diarrhea] : no diarrhea [Abdominal Pain] : no abdominal pain [Easy Bruising] : no easy bruising [Diabetes] : no diabetes [Cardiovascular Disease] : no cardiovascular disease [Pulmonary Disease] : no pulmonary disease [Anti-Platelet Agents] : no anti-platelet agents [Nicotine Dependence] : no nicotine dependence [Alcohol Use] : no  alcohol use [Renal Disease] : no renal disease [GI Disease] : no gastrointestinal disease [Sleep Apnea] : no sleep apnea [Thromboembolic Problems] : no thromboembolic problems [Frequent use of NSAIDs] : no use of NSAIDs [Transfusion Reaction] : no transfusion reaction [Impaired Immunity] : no impaired immunity [Steroid Use in Last 6 Months] : no steroid use in the last six months [Frequent Aspirin Use] : no frequent aspirin use [Prior Anesthesia] : Prior anesthesia [Prev Anesthesia Reaction] : no previous anesthesia reaction [Electrocardiogram] : ~T an ECG ~C was performed [Cardiovascular Stress Test] : a cardiac stress test ~T ~C was performed [Metabolic Capacity ___Mets%] : The patient has a metabolic capacity of [unfilled] Mets%  [Fair] : Fair [Anesthesia Reaction] : no anesthesia reaction [Sudden Death] : no sudden death [Clotting Disorder] : no clotting disorder [Bleeding Disorder] : no bleeding disorder [de-identified] : Dr Da Silva [FreeTextEntry1] : Mr. DELGADO presents for a follow up today. He denies chest pain, dyspnea or palpitations. No issues reported with his medications. Otherwise, he is feeling well.\par \par Completed a stress echo today. The study is most remarkable for reduce exercise quality. However, no signs of heart disease noted.

## 2020-03-12 NOTE — DISCUSSION/SUMMARY
[FreeTextEntry1] : Pre-op From cardiovascular standpoint, Mr. DELGADO is of acceptable risk for the planned procedure and may move forward without further cardiovascular testing.\par HLD FABIAN and I discussed his lipid panel and individualized target LDL goal. At this point, will do diet and exercise with anticipation of re-evaluating labs in 3-6 months\par HTN - FABIAN  and I had an extensive discussion regarding his blood pressure management. Patient will continue taking current medications in addition to maintaining a low Na diet, with periodic b/p checks at home.\par SOB Inclined towards a conservative follow up in this patient. We had a careful discussion regarding diet and exercise. Will be happy to re-evaluate.\par Emotional support provided.\par

## 2020-06-11 ENCOUNTER — APPOINTMENT (OUTPATIENT)
Dept: HEART AND VASCULAR | Facility: CLINIC | Age: 45
End: 2020-06-11

## 2020-07-06 ENCOUNTER — NON-APPOINTMENT (OUTPATIENT)
Age: 45
End: 2020-07-06

## 2020-07-06 ENCOUNTER — APPOINTMENT (OUTPATIENT)
Dept: HEART AND VASCULAR | Facility: CLINIC | Age: 45
End: 2020-07-06
Payer: COMMERCIAL

## 2020-07-06 VITALS
BODY MASS INDEX: 39.48 KG/M2 | SYSTOLIC BLOOD PRESSURE: 111 MMHG | WEIGHT: 282 LBS | HEART RATE: 83 BPM | OXYGEN SATURATION: 96 % | HEIGHT: 71 IN | TEMPERATURE: 98.4 F | DIASTOLIC BLOOD PRESSURE: 73 MMHG

## 2020-07-06 DIAGNOSIS — R07.9 CHEST PAIN, UNSPECIFIED: ICD-10-CM

## 2020-07-06 DIAGNOSIS — E78.5 HYPERLIPIDEMIA, UNSPECIFIED: ICD-10-CM

## 2020-07-06 DIAGNOSIS — I10 ESSENTIAL (PRIMARY) HYPERTENSION: ICD-10-CM

## 2020-07-06 DIAGNOSIS — Z01.818 ENCOUNTER FOR OTHER PREPROCEDURAL EXAMINATION: ICD-10-CM

## 2020-07-06 PROCEDURE — 99214 OFFICE O/P EST MOD 30 MIN: CPT

## 2020-07-06 PROCEDURE — 93000 ELECTROCARDIOGRAM COMPLETE: CPT

## 2020-07-06 RX ORDER — TRAMADOL HYDROCHLORIDE 50 MG/1
50 TABLET, COATED ORAL
Qty: 60 | Refills: 0 | Status: ACTIVE | COMMUNITY
Start: 2020-04-14 | End: 1900-01-01

## 2020-07-06 NOTE — PHYSICAL EXAM
[General Appearance - Well Developed] : well developed [Normal Appearance] : normal appearance [Well Groomed] : well groomed [General Appearance - Well Nourished] : well nourished [General Appearance - In No Acute Distress] : no acute distress [No Deformities] : no deformities [Normal Conjunctiva] : the conjunctiva exhibited no abnormalities [Normal Oral Mucosa] : normal oral mucosa [Eyelids - No Xanthelasma] : the eyelids demonstrated no xanthelasmas [No Oral Pallor] : no oral pallor [No Oral Cyanosis] : no oral cyanosis [Normal Jugular Venous A Waves Present] : normal jugular venous A waves present [Normal Jugular Venous V Waves Present] : normal jugular venous V waves present [No Jugular Venous Linton A Waves] : no jugular venous linton A waves [Respiration, Rhythm And Depth] : normal respiratory rhythm and effort [Exaggerated Use Of Accessory Muscles For Inspiration] : no accessory muscle use [Heart Rate And Rhythm] : heart rate and rhythm were normal [Auscultation Breath Sounds / Voice Sounds] : lungs were clear to auscultation bilaterally [Murmurs] : no murmurs present [Heart Sounds] : normal S1 and S2 [Abdomen Tenderness] : non-tender [Abdomen Soft] : soft [Abdomen Mass (___ Cm)] : no abdominal mass palpated [Gait - Sufficient For Exercise Testing] : the gait was sufficient for exercise testing [Abnormal Walk] : normal gait [Cyanosis, Localized] : no localized cyanosis [Nail Clubbing] : no clubbing of the fingernails [Petechial Hemorrhages (___cm)] : no petechial hemorrhages [Skin Color & Pigmentation] : normal skin color and pigmentation [] : no rash [No Venous Stasis] : no venous stasis [Skin Lesions] : no skin lesions [No Skin Ulcers] : no skin ulcer [No Xanthoma] : no  xanthoma was observed [Oriented To Time, Place, And Person] : oriented to person, place, and time [Mood] : the mood was normal [Affect] : the affect was normal [No Anxiety] : not feeling anxious

## 2020-07-06 NOTE — DISCUSSION/SUMMARY
[Procedure Low Risk] : the procedure risk is low [Patient Intermediate Risk] : the patient is an intermediate risk [Optimized for Surgery] : the patient is optimized for surgery [As per surgery] : as per surgery [FreeTextEntry1] : Pre-op From cardiovascular standpoint, Mr. DELGADO is of acceptable risk for the planned procedure and may move forward without further cardiovascular testing.\par HTN - FABIAN  and I had an extensive discussion regarding his blood pressure management. Patient will continue taking current medications in addition to maintaining a low Na diet, with periodic b/p checks at home.\par WENDY PERALTA and I discussed his lipid panel and individualized target LDL goal. At this point, will do diet and exercise with anticipation of re-evaluating labs in 3-6 months\par

## 2020-07-06 NOTE — HISTORY OF PRESENT ILLNESS
[Scheduled Procedure ___] : a [unfilled] [Preoperative Visit] : for a medical evaluation prior to surgery [Date of Surgery ___] : on [unfilled] [Surgeon Name ___] : surgeon: [unfilled] [Fever] : no fever [Chills] : no chills [Fatigue] : no fatigue [Chest Pain] : no chest pain [Cough] : no cough [Dyspnea] : no dyspnea [Dysuria] : no dysuria [Urinary Frequency] : no urinary frequency [Diarrhea] : no diarrhea [Nausea] : no nausea [Vomiting] : no vomiting [Abdominal Pain] : no abdominal pain [Easy Bruising] : no easy bruising [Lower Extremity Swelling] : no lower extremity swelling [Poor Exercise Tolerance] : no poor exercise tolerance [Cardiovascular Disease] : no cardiovascular disease [Diabetes] : no diabetes [Pulmonary Disease] : no pulmonary disease [Nicotine Dependence] : no nicotine dependence [Anti-Platelet Agents] : no anti-platelet agents [Renal Disease] : no renal disease [GI Disease] : no gastrointestinal disease [Alcohol Use] : no  alcohol use [Sleep Apnea] : no sleep apnea [Thromboembolic Problems] : no thromboembolic problems [Frequent use of NSAIDs] : no use of NSAIDs [Transfusion Reaction] : no transfusion reaction [Frequent Aspirin Use] : no frequent aspirin use [Impaired Immunity] : no impaired immunity [Steroid Use in Last 6 Months] : no steroid use in the last six months [Prev Anesthesia Reaction] : no previous anesthesia reaction [Prior Anesthesia] : Prior anesthesia [Electrocardiogram] : ~T an ECG ~C was performed [Cardiovascular Stress Test] : a cardiac stress test ~T ~C was performed [Good] : Good [Metabolic Capacity ___Mets%] : The patient has a metabolic capacity of [unfilled] Mets%  [Sudden Death] : no sudden death [Anesthesia Reaction] : no anesthesia reaction [Bleeding Disorder] : no bleeding disorder [Clotting Disorder] : no clotting disorder [de-identified] : Dr Reid Re [FreeTextEntry1] : Presents for a pre-operative evaluation prior to shoulder surgery. No chest discomfort, dyspnea or palpitations noted. EKG looks unchanged. Stress echo was done in March of 2020.

## 2020-07-09 ENCOUNTER — RX RENEWAL (OUTPATIENT)
Age: 45
End: 2020-07-09

## 2020-07-09 RX ORDER — FLUTICASONE PROPIONATE 50 UG/1
50 SPRAY, METERED NASAL
Qty: 16 | Refills: 0 | Status: ACTIVE | COMMUNITY
Start: 2020-01-22 | End: 1900-01-01

## 2020-07-13 ENCOUNTER — APPOINTMENT (OUTPATIENT)
Dept: ORTHOPEDIC SURGERY | Facility: CLINIC | Age: 45
End: 2020-07-13
Payer: COMMERCIAL

## 2020-07-13 VITALS — BODY MASS INDEX: 39.48 KG/M2 | HEIGHT: 71 IN | WEIGHT: 282 LBS

## 2020-07-13 PROCEDURE — 99213 OFFICE O/P EST LOW 20 MIN: CPT | Mod: 95

## 2020-07-13 NOTE — DISCUSSION/SUMMARY
[de-identified] : PREOP SHOULDER SURGERY DISCUSSION:\par \par PROCEDURE DISCUSSED - QUESTIONS ANSWERED\par PATIENT WISHES TO PROCEED\par \par POST OP CARE AND LIMITATIONS REVIEWED - HANDOUT PROVIDED \par \par COLD PACKS RECOMMENDED\par ANALGESICS PRESCRIBED \par \par \par THERE ARE NO GUARANTEES THAT ALL SYMPTOMS WILL BE ALLEVIATED  \par SHOULDER ARTHROSCOPY, ACROMIOPLASTY, DEBRIDEMENT,  RC REPAIR AND LABRUM REPAIRS- ON AVERAGE 75- 85% SATISFACTORY RESULTS FOR TEARS < 3CM AFTER 9-12 MONTHS HEALING AND REHABILITATION. \par \par REPAIRS WILL REQUIRE STRICT SHOULDER IMMOBILIZER 4-6 WEEKS\par \par RC TEARS 3CM OR LARGER MAY REQUIRE COLLAGEN PATCH AUGMENTATION GENERALLY HAVE LESS SATISFACTORY RESULTS\par \par PHYSICAL THERAPY REQUIRED 2X WEEK FOR  MINIMUM 8-12 WEEKS FOR ALL PROCEDURES \par CONTINUED HOME EXERCISES 6-9 MONTHS AFTER THAT REQUIRED FOR OPTIMAL OUTCOMES \par \par ROUTINE SURGICAL AND ANESTHETIC RISKS INCLUDE RISK OF SURGICAL INFECTION, ANESTHETIC COMPLICATION OR ALLERGY, POSSIBLE RETEARS OR PROGRESSION OF TEAR, STIFFNESS OF SHOULDER AND UNSATISFACTORY OUTCOMES\par \par PATIENT UNDERSTANDS AND WISHES TO PROCEED\par

## 2020-07-13 NOTE — HISTORY OF PRESENT ILLNESS
[de-identified] : LEFT SHOULDER\par PRE SURGICAL\par ICE AND MEDICATION AS NEEDED\par PAIN LEVEL 6/10\par RADIATING PAIN\par THROBBING\par

## 2020-07-13 NOTE — PHYSICAL EXAM
[de-identified] : PHYSICAL EXAM LEFT  SHOULDER\par \par SCAPULAR PROTRACTION\par AROM 140 / 140 / 80 / 10\par TENDER: SA REGION \par \par SPECIAL TESTING :\par HUGHES - POSITIVE \par VICKIE - POSITIVE \par SPEED TEST - POSITIVE\par \par ROMAN - NEGATIVE \par APPREHENSION AND SUPPRESSION - NEGATIVE \par \par RC STRENGTH TESTING \par SS:  5/5\par SUB 5/5\par IS     5/5\par BICEPS  5/5\par \par SENSATION  - GROSSLY INTACT\par \par \par

## 2020-07-14 ENCOUNTER — APPOINTMENT (OUTPATIENT)
Dept: ORTHOPEDIC SURGERY | Facility: AMBULATORY SURGERY CENTER | Age: 45
End: 2020-07-14
Payer: COMMERCIAL

## 2020-07-14 PROCEDURE — 29823 SHO ARTHRS SRG XTNSV DBRDMT: CPT | Mod: LT,59

## 2020-07-14 PROCEDURE — 29826 SHO ARTHRS SRG DECOMPRESSION: CPT | Mod: LT

## 2020-07-14 PROCEDURE — 29820 SHO ARTHRS SRG PRTL SYNVCT: CPT | Mod: LT,59

## 2020-07-16 ENCOUNTER — APPOINTMENT (OUTPATIENT)
Dept: ORTHOPEDIC SURGERY | Facility: CLINIC | Age: 45
End: 2020-07-16
Payer: COMMERCIAL

## 2020-07-16 VITALS — TEMPERATURE: 98.4 F

## 2020-07-16 DIAGNOSIS — M75.111 INCOMPLETE ROTATOR CUFF TEAR OR RUPTURE OF RIGHT SHOULDER, NOT SPECIFIED AS TRAUMATIC: ICD-10-CM

## 2020-07-16 PROCEDURE — 99024 POSTOP FOLLOW-UP VISIT: CPT

## 2020-07-16 PROCEDURE — 73030 X-RAY EXAM OF SHOULDER: CPT | Mod: RT

## 2020-07-16 NOTE — DISCUSSION/SUMMARY
[de-identified] : POST OP LUANN\par JULY 14, 2020 - LEFT LUANN, 4MM PASTA \par \par COLD THERAPY,ANALGESICS AS NEEDED- WEAN NARCOTICS\par \par DISCONTINUE SLING  - DESKTOP WORK ALLOWED\par \par START PENDULUM EXERCISES, AAROM - DEMONSTARTED \par \par RESUME CARDIO 2 WEEKS - NO SHOULDER CHEST WEIGHTS\par \par START PT WITHIMN 7-14 DAY\par OFFICE FU 6 WEEKS\par

## 2020-07-16 NOTE — PHYSICAL EXAM
[de-identified] : POST OP SHOULDER EXAM \par \par PORTALS HEALING WELL - NO ERYTHEMA OR CALOR\par SUTURES REMOVED, STERISTRIPS AND WATERPROOF BANDAIDS  APPLIED \par \par AROM  PENDULUM AAROM \par \par DISTAL CMS INTACT\par

## 2020-07-16 NOTE — HISTORY OF PRESENT ILLNESS
[de-identified] : LEFT SHOULDER\par POST OP- 2 DAYS\par JULY 14, 2020 - LEFT LUANN, 4MM PASTA \par PATIENT IN SLING\par PAIN LEVEL 8/10\par NO NUMBNESS OR TINLGING\par GENERAL WEAKNESS\par SWELLING

## 2020-08-05 RX ORDER — OXYCODONE AND ACETAMINOPHEN 7.5; 325 MG/1; MG/1
7.5-325 TABLET ORAL
Qty: 42 | Refills: 0 | Status: DISCONTINUED | COMMUNITY
Start: 2020-07-14 | End: 2020-08-05

## 2020-10-08 ENCOUNTER — APPOINTMENT (OUTPATIENT)
Dept: HEART AND VASCULAR | Facility: CLINIC | Age: 45
End: 2020-10-08

## 2020-10-19 RX ORDER — OXYCODONE HYDROCHLORIDE AND ACETAMINOPHEN 7.5; 325 MG/1; MG/1
7.5-325 TABLET ORAL
Qty: 42 | Refills: 0 | Status: ACTIVE | COMMUNITY
Start: 2020-07-14 | End: 1900-01-01

## 2020-10-29 ENCOUNTER — APPOINTMENT (OUTPATIENT)
Dept: ORTHOPEDIC SURGERY | Facility: CLINIC | Age: 45
End: 2020-10-29
Payer: COMMERCIAL

## 2020-10-29 VITALS — TEMPERATURE: 96.5 F

## 2020-10-29 DIAGNOSIS — M75.112 INCOMPLETE ROTATOR CUFF TEAR OR RUPTURE OF LEFT SHOULDER, NOT SPECIFIED AS TRAUMATIC: ICD-10-CM

## 2020-10-29 PROCEDURE — 99072 ADDL SUPL MATRL&STAF TM PHE: CPT

## 2020-10-29 PROCEDURE — 99213 OFFICE O/P EST LOW 20 MIN: CPT

## 2020-10-29 RX ORDER — DICLOFENAC SODIUM 75 MG/1
75 TABLET, DELAYED RELEASE ORAL TWICE DAILY
Qty: 60 | Refills: 3 | Status: ACTIVE | COMMUNITY
Start: 2020-10-29 | End: 1900-01-01

## 2020-10-29 NOTE — DISCUSSION/SUMMARY
[de-identified] : POST OP LUANN\par JULY 14, 2020 - LEFT LUANN, 4MM PASTA \par \par NSAIDS 2-3 WEEKS \par \par PT 2 X WEEKS

## 2020-10-29 NOTE — HISTORY OF PRESENT ILLNESS
[de-identified] : LEFT SHOULDER\par POST OP\par JULY 14, 2020 - LEFT LUANN, 4MM PASTA \par PAIN LEVEL 6/10, WORSE AT NIGHT\par PHYSICAL THERAPY HELPFUL

## 2020-10-29 NOTE — PHYSICAL EXAM
[de-identified] : POST OP SHOULDER EXAM \par \par PORTALS HEALING WELL -\par \par LEFT  AROM  120 / 120 / 80 / 20\par DISTAL CMS INTACT\par

## 2020-11-23 RX ORDER — OXYCODONE HYDROCHLORIDE AND ACETAMINOPHEN 7.5; 325 MG/1; MG/1
7.5-325 TABLET ORAL
Qty: 42 | Refills: 0 | Status: ACTIVE | COMMUNITY
Start: 2020-07-13 | End: 1900-01-01

## 2020-12-03 RX ORDER — TRAMADOL HYDROCHLORIDE 50 MG/1
50 TABLET, COATED ORAL
Qty: 60 | Refills: 0 | Status: ACTIVE | COMMUNITY
Start: 2020-12-03 | End: 1900-01-01

## 2020-12-11 RX ORDER — HYDROCODONE BITARTRATE AND ACETAMINOPHEN 7.5; 325 MG/1; MG/1
7.5-325 TABLET ORAL
Qty: 60 | Refills: 0 | Status: ACTIVE | COMMUNITY
Start: 2020-12-11 | End: 1900-01-01

## 2022-02-04 ENCOUNTER — APPOINTMENT (OUTPATIENT)
Age: 47
End: 2022-02-04
Payer: COMMERCIAL

## 2022-02-04 VITALS
DIASTOLIC BLOOD PRESSURE: 68 MMHG | OXYGEN SATURATION: 96 % | BODY MASS INDEX: 40.74 KG/M2 | WEIGHT: 291 LBS | HEART RATE: 98 BPM | RESPIRATION RATE: 14 BRPM | SYSTOLIC BLOOD PRESSURE: 108 MMHG | HEIGHT: 71 IN

## 2022-02-04 DIAGNOSIS — K21.9 GASTRO-ESOPHAGEAL REFLUX DISEASE W/OUT ESOPHAGITIS: ICD-10-CM

## 2022-02-04 DIAGNOSIS — Z87.891 PERSONAL HISTORY OF NICOTINE DEPENDENCE: ICD-10-CM

## 2022-02-04 DIAGNOSIS — Z81.1 FAMILY HISTORY OF ALCOHOL ABUSE AND DEPENDENCE: ICD-10-CM

## 2022-02-04 DIAGNOSIS — Z86.39 PERSONAL HISTORY OF OTHER ENDOCRINE, NUTRITIONAL AND METABOLIC DISEASE: ICD-10-CM

## 2022-02-04 DIAGNOSIS — Z82.49 FAMILY HISTORY OF ISCHEMIC HEART DISEASE AND OTHER DISEASES OF THE CIRCULATORY SYSTEM: ICD-10-CM

## 2022-02-04 PROCEDURE — 99204 OFFICE O/P NEW MOD 45 MIN: CPT | Mod: 25

## 2022-02-04 PROCEDURE — 71046 X-RAY EXAM CHEST 2 VIEWS: CPT

## 2022-02-04 NOTE — PROCEDURE
[FreeTextEntry1] : lainey: mild obstruction pattern \par \par \par Reason : SOb\par Overall Findings:\par \par View Ap and lateral  \par \par \par Lung Fields:\par \par Normal Lung Markings                   No Infiltrate\par \par Pleura:\par \par No Pleural Effusions                      \par \par \par final Interpretation: with normal \par

## 2022-02-04 NOTE — HISTORY OF PRESENT ILLNESS
[Initial Evaluation - Existing Diagnosis] : an initial evaluation of an existing diagnosis of [Excessive Daytime Sleepiness] : excessive daytime sleepiness [Witnessed Gasping During Sleep] : witnessed gasping during sleep [Snoring] : snoring [Unrefreshing Sleep] : unrefreshing sleep [Sleepy When Sedentary] : sleepy when sedentary [Currently Experiencing] : The patient is currently experiencing symptoms. [Shortness of Breath] : shortness of breath [None] : The patient is not currently being treated for this problem [Obesity] : obesity [Asthma] : asthma [GERD] : gastroesophageal reflux disease

## 2022-02-04 NOTE — REVIEW OF SYSTEMS
[Fatigue] : fatigue [Chest Tightness] : chest tightness [Wheezing] : wheezing [SOB on Exertion] : sob on exertion [Negative] : Endocrine

## 2022-02-04 NOTE — REASON FOR VISIT
[Initial] : an initial visit [Asthma] : asthma [Sleep Apnea] : sleep apnea Advancement Flap (Double) Text: The defect edges were debeveled with a #15 scalpel blade.  Given the location of the defect and the proximity to free margins a double advancement flap was deemed most appropriate.  Using a sterile surgical marker, the appropriate advancement flaps were drawn incorporating the defect and placing the expected incisions within the relaxed skin tension lines where possible.    The area thus outlined was incised deep to adipose tissue with a #15 scalpel blade.  The skin margins were undermined to an appropriate distance in all directions utilizing iris scissors.

## 2022-03-24 ENCOUNTER — OUTPATIENT (OUTPATIENT)
Dept: OUTPATIENT SERVICES | Facility: HOSPITAL | Age: 47
LOS: 1 days | Discharge: HOME | End: 2022-03-24
Payer: COMMERCIAL

## 2022-03-24 DIAGNOSIS — Z98.890 OTHER SPECIFIED POSTPROCEDURAL STATES: Chronic | ICD-10-CM

## 2022-03-24 PROCEDURE — 95810 POLYSOM 6/> YRS 4/> PARAM: CPT | Mod: 26

## 2022-03-25 DIAGNOSIS — G47.33 OBSTRUCTIVE SLEEP APNEA (ADULT) (PEDIATRIC): ICD-10-CM

## 2022-04-29 ENCOUNTER — EMERGENCY (EMERGENCY)
Facility: HOSPITAL | Age: 47
LOS: 0 days | Discharge: HOME | End: 2022-04-29
Attending: EMERGENCY MEDICINE | Admitting: EMERGENCY MEDICINE
Payer: COMMERCIAL

## 2022-04-29 VITALS
OXYGEN SATURATION: 98 % | HEART RATE: 83 BPM | RESPIRATION RATE: 16 BRPM | TEMPERATURE: 97 F | WEIGHT: 289.91 LBS | HEIGHT: 71 IN | DIASTOLIC BLOOD PRESSURE: 84 MMHG | SYSTOLIC BLOOD PRESSURE: 141 MMHG

## 2022-04-29 VITALS
DIASTOLIC BLOOD PRESSURE: 85 MMHG | OXYGEN SATURATION: 98 % | SYSTOLIC BLOOD PRESSURE: 138 MMHG | HEART RATE: 78 BPM | RESPIRATION RATE: 18 BRPM

## 2022-04-29 DIAGNOSIS — I10 ESSENTIAL (PRIMARY) HYPERTENSION: ICD-10-CM

## 2022-04-29 DIAGNOSIS — S09.90XA UNSPECIFIED INJURY OF HEAD, INITIAL ENCOUNTER: ICD-10-CM
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
## 2022-04-29 DIAGNOSIS — S30.1XXA CONTUSION OF ABDOMINAL WALL, INITIAL ENCOUNTER: ICD-10-CM
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
## 2022-04-29 DIAGNOSIS — K21.9 GASTRO-ESOPHAGEAL REFLUX DISEASE WITHOUT ESOPHAGITIS: ICD-10-CM

## 2022-04-29 DIAGNOSIS — Z98.890 OTHER SPECIFIED POSTPROCEDURAL STATES: Chronic | ICD-10-CM

## 2022-04-29 DIAGNOSIS — Z88.5 ALLERGY STATUS TO NARCOTIC AGENT: ICD-10-CM

## 2022-04-29 DIAGNOSIS — R42 DIZZINESS AND GIDDINESS: ICD-10-CM

## 2022-04-29 DIAGNOSIS — J45.909 UNSPECIFIED ASTHMA, UNCOMPLICATED: ICD-10-CM

## 2022-04-29 DIAGNOSIS — E78.5 HYPERLIPIDEMIA, UNSPECIFIED: ICD-10-CM

## 2022-04-29 DIAGNOSIS — R51.9 HEADACHE, UNSPECIFIED: ICD-10-CM

## 2022-04-29 PROCEDURE — 99285 EMERGENCY DEPT VISIT HI MDM: CPT | Mod: 25

## 2022-04-29 PROCEDURE — 70450 CT HEAD/BRAIN W/O DYE: CPT | Mod: 26,MA

## 2022-04-29 PROCEDURE — 93308 TTE F-UP OR LMTD: CPT | Mod: 26

## 2022-04-29 PROCEDURE — 76705 ECHO EXAM OF ABDOMEN: CPT | Mod: 26

## 2022-04-29 RX ORDER — ACETAMINOPHEN 500 MG
975 TABLET ORAL ONCE
Refills: 0 | Status: COMPLETED | OUTPATIENT
Start: 2022-04-29 | End: 2022-04-29

## 2022-04-29 RX ADMIN — Medication 975 MILLIGRAM(S): at 16:21

## 2022-04-29 NOTE — ED PROVIDER NOTE - CLINICAL SUMMARY MEDICAL DECISION MAKING FREE TEXT BOX
Imaging obtained.  results reviewed and d/w pt.  Pt wants to go home. f/u PMD. Pt instructed to return if any worsening symptoms or concerns.  They verbalize understanding.

## 2022-04-29 NOTE — ED PROVIDER NOTE - PATIENT PORTAL LINK FT
You can access the FollowMyHealth Patient Portal offered by [facility] by registering at the following website: [email or web address]. By joining [employer]’s FollowMyHealth portal, you will also be able to view your health information using other applications (apps) compatible with our system.

## 2022-04-29 NOTE — ED PROVIDER NOTE - PHYSICAL EXAMINATION
GENERAL: Well-nourished, Well-developed. NAD.  HEAD: No visible or palpable bumps or hematomas. No ecchymosis behind ears B/L.  Eyes: PERRLA, EOMI. No asymmetry. No nystagmus. No conjunctival injection. Non-icteric sclera.  ENMT: MMM.   Neck: Supple. FROM  CVS: Normal S1,S2. No murmurs appreciated on auscultation   RESP: No use of accessory muscles. Chest rise symmetrical with good expansion. Lungs clear to auscultation B/L. No wheezing, rales, or rhonchi auscultated.  GI: Normal auscultation of bowel sounds in all 4 quadrants. (+)ecchymosis to R lower abdomen. Soft, Nontender, Nondistended. No guarding or rebound tenderness. No CVAT B/L.  MSK: Extremities w/o deformity or ttp. No visible signs of trauma such as ecchymosis, erythema, or swelling. FROM of upper and lower extremities B/L. No midline spinal TTP. FROM of back with flexion and extension.  Skin: Warm, Dry. No rashes or lesions. Good cap refill < 2 sec B/L.  Neuro: AA&O x 3. CNs II-XII grossly intact. Speaking in full sentences. No slurring of speech. No facial droop. No tremors. Sensation grossly intact. Strength 5/5 B/L. Gait within normal limits. Negative Romberg and Pronator Drift. Normal Finger to nose exam. Visual fields intact.

## 2022-04-29 NOTE — ED PROCEDURE NOTE - NS ED ATTENDING STATEMENT MOD
This was a shared visit with the DAV. I reviewed and verified the documentation and independently performed the documented:

## 2022-04-29 NOTE — ED PROVIDER NOTE - NSFOLLOWUPCLINICS_GEN_ALL_ED_FT
[facility] Concussion Program  Concussion Program  [address] [place] [address]   Phone: [phone]  Fax:   Follow Up Time: 1-3 Days
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
## 2022-04-29 NOTE — ED PROVIDER NOTE - NS ED ROS FT
Constitutional: (-) fever (-) malaise (-) diaphoresis (-) chills   Eyes: (-) visual changes (-) eye pain (-) eye discharge (-) photophobia (-) FB sensation  Cardiac: (-) chest pain  (-) palpitations (-) syncope (-) edema  Respiratory: (-) cough (-) SOB (-) MOYER  GI: (-) nausea (-) vomiting (-) diarrhea (-) abdominal pain  : (-) dysuria (-) increased frequency  (-) hematuria (-) incontinence  MS: (-) back pain (-) myalgia (-) muscle weakness (-)  joint pain  Neuro: (+) headache (+) dizziness (-) numbness/tingling to extremities B/L (-) weakness (-) LOC (+) head injury  Skin: (-) rash (-) laceration    Except as documented in the HPI, all other systems are negative.
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
## 2022-05-27 ENCOUNTER — APPOINTMENT (OUTPATIENT)
Age: 47
End: 2022-05-27

## 2022-06-30 NOTE — ED ADULT TRIAGE NOTE - BP NONINVASIVE DIASTOLIC (MM HG)
Post-Care Instructions: Patient instructed to not lie down for 4 hours and limit physical activity for 24 hours. Patient instructed not to travel by airplane for 48 hours. 84

## 2022-07-12 ENCOUNTER — RX RENEWAL (OUTPATIENT)
Age: 47
End: 2022-07-12

## 2022-07-13 ENCOUNTER — RX RENEWAL (OUTPATIENT)
Age: 47
End: 2022-07-13

## 2022-07-18 ENCOUNTER — RX RENEWAL (OUTPATIENT)
Age: 47
End: 2022-07-18

## 2022-07-22 ENCOUNTER — RX RENEWAL (OUTPATIENT)
Age: 47
End: 2022-07-22

## 2022-08-01 ENCOUNTER — RX RENEWAL (OUTPATIENT)
Age: 47
End: 2022-08-01

## 2022-08-02 ENCOUNTER — APPOINTMENT (OUTPATIENT)
Dept: ORTHOPEDIC SURGERY | Facility: CLINIC | Age: 47
End: 2022-08-02

## 2022-08-02 PROCEDURE — 99213 OFFICE O/P EST LOW 20 MIN: CPT | Mod: 25

## 2022-08-02 PROCEDURE — 20611 DRAIN/INJ JOINT/BURSA W/US: CPT | Mod: 50

## 2022-08-02 RX ORDER — CEPHALEXIN 500 MG/1
500 TABLET ORAL
Qty: 10 | Refills: 0 | Status: DISCONTINUED | COMMUNITY
Start: 2022-04-13

## 2022-08-02 RX ORDER — FAMOTIDINE 40 MG/1
40 TABLET, FILM COATED ORAL
Qty: 90 | Refills: 0 | Status: DISCONTINUED | COMMUNITY
Start: 2022-06-21

## 2022-08-02 RX ORDER — OXYCODONE AND ACETAMINOPHEN 5; 325 MG/1; MG/1
5-325 TABLET ORAL
Qty: 14 | Refills: 0 | Status: DISCONTINUED | COMMUNITY
Start: 2022-05-26

## 2022-08-02 RX ORDER — NABUMETONE 750 MG/1
750 TABLET, FILM COATED ORAL
Qty: 60 | Refills: 0 | Status: DISCONTINUED | COMMUNITY
Start: 2022-06-15

## 2022-08-02 RX ORDER — SULFAMETHOXAZOLE AND TRIMETHOPRIM 800; 160 MG/1; MG/1
800-160 TABLET ORAL
Qty: 14 | Refills: 0 | Status: DISCONTINUED | COMMUNITY
Start: 2022-04-26

## 2022-08-02 RX ORDER — PILOCARPINE HYDROCHLORIDE 12.5 MG/ML
1.25 SOLUTION/ DROPS OPHTHALMIC
Qty: 5 | Refills: 0 | Status: DISCONTINUED | COMMUNITY
Start: 2022-07-25

## 2022-08-02 NOTE — PROCEDURE
[Bilateral] : bilaterally of the [Knee] : knee [___ cc    1%] : Lidocaine ~Vcc of 1%  [___ cc    4mg] : Dexamethasone (Decadron) ~Vcc of 4 mg  [Previous OTC use and PT nontherapeutic] : patient has tried OTC's including aspirin, Ibuprofen, Aleve, etc or prescription NSAIDS, and/or exercises at home and/or physical therapy without satisfactory response [Risks, benefits, alternatives discussed / Verbal consent obtained] : the risks benefits, and alternatives have been discussed, and verbal consent was obtained [All ultrasound images have been permanently captured and stored accordingly in our picture archiving and communication system] : All ultrasound images have been permanently captured and stored accordingly in our picture archiving and communication system [Visualization of the needle and placement of injection was performed without complication] : visualization of the needle and placement of injection was performed without complication

## 2022-08-02 NOTE — DISCUSSION/SUMMARY
[de-identified] :   Today I recommend a cortisone injection for each knee.  The patient agreed.  Both knees were injected with cortisone, procedure note generated.  Send authorization formal physical therapy for both knees.  Rx provided for that.  Rx for nabumetone sent to the pharmacy.  He is interested in surgical intervention if   Dr. Lainez feels that is warranted.  I sent him a message regarding this.  He is unable to work, 100% temporary disabled.  He will follow up in 4 weeks for further evaluation with Dr. Lainez.\par \par Supervising physician:  Dr. Guillermo

## 2022-08-02 NOTE — PHYSICAL EXAM
[Bilateral] : knee bilaterally [NL (0)] : extension 0 degrees [Positive] : positive Carla [] : patient ambulates without assistive device [FreeTextEntry8] :  Significant tenderness to palpation over the medial joint line of each knee.  He has tenderness to palpation anteriorly of each knee. [TWNoteComboBox7] : flexion 120 degrees

## 2022-08-02 NOTE — HISTORY OF PRESENT ILLNESS
[de-identified] : The patient is a 47-year-old male here for subsequent re-evaluation of both knees.  He was injured at work.  He has an MRI of each knee is shows a likely medial meniscus tear in each knee.  He is having a lot of pain in each knee, especially with twisting.  He has pain up and down the stairs.  He has been doing home therapy exercises with no relief.  He is taking nabumetone which did help him but he ran out of the medication.

## 2022-08-11 ENCOUNTER — FORM ENCOUNTER (OUTPATIENT)
Age: 47
End: 2022-08-11

## 2022-08-29 ENCOUNTER — APPOINTMENT (OUTPATIENT)
Dept: ORTHOPEDIC SURGERY | Facility: CLINIC | Age: 47
End: 2022-08-29

## 2022-08-29 ENCOUNTER — RX RENEWAL (OUTPATIENT)
Age: 47
End: 2022-08-29

## 2022-08-29 PROCEDURE — 99072 ADDL SUPL MATRL&STAF TM PHE: CPT

## 2022-08-29 PROCEDURE — 99214 OFFICE O/P EST MOD 30 MIN: CPT

## 2022-08-29 NOTE — HISTORY OF PRESENT ILLNESS
[de-identified] : Patient is here today follow-up on his knees right is worse than left he had failed non operative management he wants to get back to work and can't because of these issues\par \par  right knee and left knee have pain over the medial aspect need for positive Stephany's small knee effusions in both her limited range of motion but  Homans sign is negative there is no erythema\par \par  right knee is more symptomatic MRI confirming medial meniscal tear had failed non operative management so recommend authorization for right knee arthroscopy partial medial meniscectomy, is unable to work 100% template disabled.\par \par   Left knee also has had non operative management of anti-inflammatories, stretching and strengthening, recommend authorization for left knee arthroscopy to rule out occult  medial meniscal tear and loose chondromalacia

## 2022-09-16 ENCOUNTER — NON-APPOINTMENT (OUTPATIENT)
Age: 47
End: 2022-09-16

## 2022-09-26 ENCOUNTER — APPOINTMENT (OUTPATIENT)
Dept: ORTHOPEDIC SURGERY | Facility: CLINIC | Age: 47
End: 2022-09-26

## 2022-09-26 PROCEDURE — 99072 ADDL SUPL MATRL&STAF TM PHE: CPT | Mod: ACP

## 2022-09-26 PROCEDURE — 99213 OFFICE O/P EST LOW 20 MIN: CPT | Mod: ACP

## 2022-09-26 NOTE — HISTORY OF PRESENT ILLNESS
[de-identified] : The patient is a 47-year-old male here for subsequent re-evaluation of both knees.  He has a medial meniscus tear in each knee.  He has had 2 weeks of formal physical therapy for each knee.  The right knee troubles more than the left knee.

## 2022-09-26 NOTE — PHYSICAL EXAM
[Bilateral] : knee bilaterally [Positive] : positive Carla [] : no erythema [FreeTextEntry9] : left knee flexion to 125 degrees\par right knee flexion to 100 degrees [de-identified] : Bilateral positive McMurrays [de-identified] : extension 0 degrees

## 2022-09-26 NOTE — DISCUSSION/SUMMARY
[de-identified] :   At this point I recommend he continues formal therapy for both knees, Rx provided for that.  I will see him back in 4 weeks for further evaluation.  At that point time he will have had 6 weeks of physician directed treatment including formal physical therapy.  He is unable work, 100% temporary disabled.\par \par Supervising physician:  Dr. Lainez

## 2022-10-03 ENCOUNTER — APPOINTMENT (OUTPATIENT)
Dept: PULMONOLOGY | Facility: CLINIC | Age: 47
End: 2022-10-03

## 2022-10-03 VITALS
HEIGHT: 71 IN | BODY MASS INDEX: 39.76 KG/M2 | RESPIRATION RATE: 14 BRPM | OXYGEN SATURATION: 97 % | SYSTOLIC BLOOD PRESSURE: 114 MMHG | HEART RATE: 91 BPM | WEIGHT: 284 LBS | DIASTOLIC BLOOD PRESSURE: 76 MMHG

## 2022-10-03 DIAGNOSIS — Z01.818 ENCOUNTER FOR OTHER PREPROCEDURAL EXAMINATION: ICD-10-CM

## 2022-10-03 PROCEDURE — 99214 OFFICE O/P EST MOD 30 MIN: CPT

## 2022-10-03 RX ORDER — FLUTICASONE PROPIONATE 220 MCG
AEROSOL WITH ADAPTER (GRAM) INHALATION
Refills: 0 | Status: COMPLETED | COMMUNITY
End: 2022-10-03

## 2022-10-03 NOTE — END OF VISIT
[] : Fellow [FreeTextEntry3] : patient seen and examined agree above note \par start APAP \par patient was counseled to use the machine every night at least 4 hrs \par also counseled for weight loss and exercise\par PFT \par cxr \par continue breo

## 2022-10-03 NOTE — PHYSICAL EXAM
[No Acute Distress] : no acute distress [Normal Oropharynx] : normal oropharynx [IV] : Mallampati Class: IV [Normal Appearance] : normal appearance [Supple] : supple [Normal Rate/Rhythm] : normal rate/rhythm [No Resp Distress] : no resp distress [Clear to Auscultation Bilaterally] : clear to auscultation bilaterally [Normal Color/ Pigmentation] : normal color/ pigmentation [Oriented x3] : oriented x3 [TextBox_2] : obese

## 2022-10-09 ENCOUNTER — FORM ENCOUNTER (OUTPATIENT)
Age: 47
End: 2022-10-09

## 2022-10-11 ENCOUNTER — FORM ENCOUNTER (OUTPATIENT)
Age: 47
End: 2022-10-11

## 2022-10-24 ENCOUNTER — RX RENEWAL (OUTPATIENT)
Age: 47
End: 2022-10-24

## 2022-10-28 ENCOUNTER — APPOINTMENT (OUTPATIENT)
Dept: ORTHOPEDIC SURGERY | Facility: AMBULATORY SURGERY CENTER | Age: 47
End: 2022-10-28

## 2022-10-28 PROCEDURE — 29881 ARTHRS KNE SRG MNISECTMY M/L: CPT | Mod: RT

## 2022-11-02 ENCOUNTER — APPOINTMENT (OUTPATIENT)
Dept: ORTHOPEDIC SURGERY | Facility: CLINIC | Age: 47
End: 2022-11-02

## 2022-11-08 ENCOUNTER — EMERGENCY (EMERGENCY)
Facility: HOSPITAL | Age: 47
LOS: 0 days | Discharge: LEFT BEFORE DISCHARGE | End: 2022-11-08
Admitting: EMERGENCY MEDICINE

## 2022-11-08 ENCOUNTER — APPOINTMENT (OUTPATIENT)
Dept: ORTHOPEDIC SURGERY | Facility: CLINIC | Age: 47
End: 2022-11-08

## 2022-11-08 VITALS
TEMPERATURE: 98 F | DIASTOLIC BLOOD PRESSURE: 67 MMHG | SYSTOLIC BLOOD PRESSURE: 145 MMHG | OXYGEN SATURATION: 98 % | HEART RATE: 78 BPM | RESPIRATION RATE: 18 BRPM

## 2022-11-08 DIAGNOSIS — E78.5 HYPERLIPIDEMIA, UNSPECIFIED: ICD-10-CM

## 2022-11-08 DIAGNOSIS — J45.909 UNSPECIFIED ASTHMA, UNCOMPLICATED: ICD-10-CM

## 2022-11-08 DIAGNOSIS — K21.9 GASTRO-ESOPHAGEAL REFLUX DISEASE WITHOUT ESOPHAGITIS: ICD-10-CM

## 2022-11-08 DIAGNOSIS — Z53.29 PROCEDURE AND TREATMENT NOT CARRIED OUT BECAUSE OF PATIENT'S DECISION FOR OTHER REASONS: ICD-10-CM
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
## 2022-11-08 DIAGNOSIS — M25.561 PAIN IN RIGHT KNEE: ICD-10-CM

## 2022-11-08 DIAGNOSIS — M79.661 PAIN IN RIGHT LOWER LEG: ICD-10-CM
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
## 2022-11-08 DIAGNOSIS — Z98.890 OTHER SPECIFIED POSTPROCEDURAL STATES: Chronic | ICD-10-CM

## 2022-11-08 DIAGNOSIS — I10 ESSENTIAL (PRIMARY) HYPERTENSION: ICD-10-CM

## 2022-11-08 DIAGNOSIS — Z88.5 ALLERGY STATUS TO NARCOTIC AGENT: ICD-10-CM

## 2022-11-08 DIAGNOSIS — S80.11XA CONTUSION OF RIGHT LOWER LEG, INITIAL ENCOUNTER: ICD-10-CM

## 2022-11-08 DIAGNOSIS — M79.89 OTHER SPECIFIED SOFT TISSUE DISORDERS: ICD-10-CM

## 2022-11-08 LAB
ALBUMIN SERPL ELPH-MCNC: 4.9 G/DL — SIGNIFICANT CHANGE UP (ref 3.5–5.2)
ALT FLD-CCNC: 19 U/L — SIGNIFICANT CHANGE UP (ref 0–41)
AST SERPL-CCNC: 16 U/L — SIGNIFICANT CHANGE UP (ref 0–41)
BASOPHILS NFR BLD AUTO: 0.6 % — SIGNIFICANT CHANGE UP (ref 0–1)
CALCIUM SERPL-MCNC: 9.8 MG/DL — SIGNIFICANT CHANGE UP (ref 8.4–10.5)
CHLORIDE SERPL-SCNC: 103 MMOL/L — SIGNIFICANT CHANGE UP (ref 98–110)
CREAT SERPL-MCNC: 1 MG/DL — SIGNIFICANT CHANGE UP (ref 0.7–1.5)
EOSINOPHIL # BLD AUTO: 0.19 K/UL — SIGNIFICANT CHANGE UP (ref 0–0.7)
EOSINOPHIL NFR BLD AUTO: 2.4 % — SIGNIFICANT CHANGE UP (ref 0–8)
GLUCOSE SERPL-MCNC: 96 MG/DL — SIGNIFICANT CHANGE UP (ref 70–99)
HGB BLD-MCNC: 15.3 G/DL — SIGNIFICANT CHANGE UP (ref 14–18)
IMM GRANULOCYTES NFR BLD AUTO: 0.2 % — SIGNIFICANT CHANGE UP (ref 0.1–0.3)
LYMPHOCYTES # BLD AUTO: 1.39 K/UL — SIGNIFICANT CHANGE UP (ref 1.2–3.4)
MCHC RBC-ENTMCNC: 30.2 PG — SIGNIFICANT CHANGE UP (ref 27–31)
MCHC RBC-ENTMCNC: 33.4 G/DL — SIGNIFICANT CHANGE UP (ref 32–37)
MONOCYTES NFR BLD AUTO: 7.8 % — SIGNIFICANT CHANGE UP (ref 1.7–9.3)
NEUTROPHILS # BLD AUTO: 5.75 K/UL — SIGNIFICANT CHANGE UP (ref 1.4–6.5)
NEUTROPHILS NFR BLD AUTO: 71.7 % — SIGNIFICANT CHANGE UP (ref 42.2–75.2)
NRBC # BLD: 0 /100 WBCS — SIGNIFICANT CHANGE UP (ref 0–0)
PROT SERPL-MCNC: 7 G/DL — SIGNIFICANT CHANGE UP (ref 6–8)
RBC # BLD: 5.06 M/UL — SIGNIFICANT CHANGE UP (ref 4.7–6.1)
WBC # BLD: 8.03 K/UL — SIGNIFICANT CHANGE UP (ref 4.8–10.8)
WBC # FLD AUTO: 8.03 K/UL — SIGNIFICANT CHANGE UP (ref 4.8–10.8)

## 2022-11-08 PROCEDURE — 73701 CT LOWER EXTREMITY W/DYE: CPT | Mod: 26,RT,MA

## 2022-11-08 PROCEDURE — 93971 EXTREMITY STUDY: CPT | Mod: 26,RT

## 2022-11-08 PROCEDURE — 99284 EMERGENCY DEPT VISIT MOD MDM: CPT

## 2022-11-08 PROCEDURE — 99024 POSTOP FOLLOW-UP VISIT: CPT | Mod: ACP

## 2022-11-08 RX ORDER — ACETAMINOPHEN 500 MG
650 TABLET ORAL ONCE
Refills: 0 | Status: COMPLETED | OUTPATIENT
Start: 2022-11-08 | End: 2022-11-08

## 2022-11-08 RX ADMIN — Medication 650 MILLIGRAM(S): at 14:51

## 2022-11-08 NOTE — ED PROVIDER NOTE - CLINICAL SUMMARY MEDICAL DECISION MAKING FREE TEXT BOX
Patient presented with atraumatic R calf pain and bruising x 2 days. On arrival patient afebrile, HD stable but very impressive exam with (+) marked swelling of RLE. No crepitus, compartments soft, no fluctuance or superficial infection. Obtained labs which were grossly unremarkable including no significant leukocytosis, anemia, signs of dehydration/TAN, transaminitis or significant electrolyte abnormalities. Vascular duplex negative. CT leg obtained and showed (+) soft tissue edema of unclear etiology but no infection. After discussion with patient - he has upcoming f/u with his PMD and agrees to follow up as scheduled. He will return to the ED for any new or worsening symptoms.

## 2022-11-08 NOTE — ED PROVIDER NOTE - NSICDXPASTMEDICALHX_GEN_ALL_CORE_FT
PAST MEDICAL HISTORY:  Asthma     GERD (gastroesophageal reflux disease)     HLD (hyperlipidemia)     HTN (hypertension)

## 2022-11-08 NOTE — ED PROVIDER NOTE - PHYSICAL EXAMINATION
CONSTITUTIONAL: Well-developed; well-nourished; in no acute distress.   SKIN: warm, dry  HEAD: Normocephalic;  EYES: no conjunctival injection.  ENT: No nasal discharge  NECK: Supple; non tender.  EXT: +right calf swelling with bruising and tenderness to palpation. +2 arthroscopic inciscion sites of right knee well healed  NEURO: Alert, oriented, grossly unremarkable.  PSYCH: Cooperative, appropriate.

## 2022-11-08 NOTE — ED PROVIDER NOTE - PATIENT PORTAL LINK FT
You can access the FollowMyHealth Patient Portal offered by [facility] by registering at the following website: [email or web address]. By joining [employer]’s FollowMyHealth portal, you will also be able to view your health information using other applications (apps) compatible with our system.

## 2022-11-08 NOTE — PHYSICAL EXAM
[Right] : right knee [NL (0)] : extension 0 degrees [] : patient ambulates without assistive device [FreeTextEntry3] :    There is right lower leg edema, there is ecchymosis over the right calf.  Surgical portals are healing well.  Clean, dry, intact.  No surrounding erythema or drainage noted [FreeTextEntry8] :  He has tenderness with calf compression.  Medial joint line tenderness to palpation.  Equivocal Homans sign. [TWNoteComboBox7] : flexion 60 degrees

## 2022-11-08 NOTE — ED ADULT TRIAGE NOTE - CHIEF COMPLAINT QUOTE
Patient walk into ED steady gait a+ox4 reports knee surgery on 10/28, co bruising to right calf X 3 days and feeling intermittent pins and needles in right leg since yesterday.

## 2022-11-08 NOTE — DISCUSSION/SUMMARY
[de-identified] :   Today the sutures were removed, Steri-Strips were placed.  Regarding his calf pain will go to [facility] Emergency Department today be evaluated for that.  He is unable work, 100% temporary disabled.  We will hold off on any therapy until he is evaluated for the presence of DVT.  We will see him in 6 weeks for further evaluation.\par \par Supervising physician:

## 2022-11-08 NOTE — ED PROVIDER NOTE - OBJECTIVE STATEMENT
48 yo male, PMHx of asthma, recent arthroscopic surgery on right knee, presents with right calf pain and bruising x2 days, no alleviating or aggravating factors, associated with intermittent pins and needles symptoms. Denies fevers, chills, inability to ambulate, history of blood clots, new trauma, recent long travels, chest pain, shortness of breath.

## 2022-11-08 NOTE — HISTORY OF PRESENT ILLNESS
[de-identified] : The patient is a 47-year-old male here for a subsequent re-evaluation of his right knee.  He is status post right knee arthroscopy, medial meniscectomy sent to synovectomy and chondroplasty of the medial side of of the patella on 10/28/2022.  This is his initial postoperative visit.  He reports that 2 days ago he developed right calf pain.

## 2022-11-08 NOTE — ED PROVIDER NOTE - NS ED ROS FT
GEN:  no fever, no chills, no generalized weakness  CV:  no chest pain  RESP:  no sob  GI:  no nausea, no vomiting  MSK:  +right leg pain  SKIN:  +right calf bruising

## 2022-11-08 NOTE — ED PROVIDER NOTE - PROGRESS NOTE DETAILS
CP: prelim duplex negative. discussed with patient and had shared decision making. given patient strict return precautions and follow up with PMD/Dr. Aparicio. He states he will follow up, understanding return precautions and will return if needed. Patient is reliable. CP: offered CT. prelim duplex negative. discussed with patient and had shared decision making. given patient strict return precautions and follow up with PMD/Dr. Aparicio. He states he will follow up, understanding return precautions and will return if needed. Patient is reliable.

## 2022-11-08 NOTE — ED PROVIDER NOTE - NSFOLLOWUPINSTRUCTIONS_ED_ALL_ED_FT
Leg Pain    WHAT YOU NEED TO KNOW:    Leg pain may be caused by a variety of health conditions. Your tests did not show any broken bones or blood clots.    DISCHARGE INSTRUCTIONS:    Return to the emergency department if:   •You have a fever.      •Your leg starts to swell.      •Your leg pain gets worse.      •You have numbness or tingling in your leg or toes.      •You cannot put any weight on or move your leg.      Contact your healthcare provider if:   •Your pain does not decrease, even after treatment.      •You have questions or concerns about your condition or care.      Medicines:   •NSAIDs, such as ibuprofen, help decrease swelling, pain, and fever. This medicine is available with or without a doctor's order. NSAIDs can cause stomach bleeding or kidney problems in certain people. If you take blood thinner medicine, always ask your healthcare provider if NSAIDs are safe for you. Always read the medicine label and follow directions.      •Take your medicine as directed. Contact your healthcare provider if you think your medicine is not helping or if you have side effects. Tell your provider if you are allergic to any medicine. Keep a list of the medicines, vitamins, and herbs you take. Include the amounts, and when and why you take them. Bring the list or the pill bottles to follow-up visits. Carry your medicine list with you in case of an emergency.      Follow up with your healthcare provider as directed: You may need more tests to find the cause of your leg pain. You may need to see an orthopedic specialist or a physical therapist. Write down your questions so you remember to ask them during your visits.    Manage your leg pain:   •Rest your injured leg so that it can heal. You may need an immobilizer, brace, or splint to limit the movement of your leg. You may need to avoid putting any weight on your leg for at least 48 hours. Return to normal activities as directed.      •Ice the injury for 20 minutes every 4 hours for up to 24 hours, or as directed. Use an ice pack, or put crushed ice in a plastic bag. Cover it with a towel to protect your skin. Ice helps prevent tissue damage and decreases swelling and pain.      •Elevate your injured leg above the level of your heart as often as you can. This will help decrease swelling and pain. If possible, prop your leg on pillows or blankets to keep the area elevated comfortably.       •Use assistive devices as directed. You may need to use a cane or crutches. Assistive devices help decrease pain and pressure on your leg when you walk. Ask your healthcare provider for more information about assistive devices and how to use them correctly.      •Maintain a healthy weight. Extra body weight can cause pressure and pain in your hip, knee, and ankle joints. Ask your healthcare provider how much you should weigh. Ask him to help you create a weight loss plan if you are overweight.

## 2022-11-15 DIAGNOSIS — Z98.890 OTHER SPECIFIED POSTPROCEDURAL STATES: ICD-10-CM

## 2022-11-17 ENCOUNTER — FORM ENCOUNTER (OUTPATIENT)
Age: 47
End: 2022-11-17

## 2022-12-05 ENCOUNTER — APPOINTMENT (OUTPATIENT)
Age: 47
End: 2022-12-05

## 2022-12-07 ENCOUNTER — RX RENEWAL (OUTPATIENT)
Age: 47
End: 2022-12-07

## 2022-12-07 RX ORDER — NABUMETONE 750 MG/1
750 TABLET, FILM COATED ORAL
Qty: 60 | Refills: 0 | Status: ACTIVE | COMMUNITY
Start: 2022-08-02 | End: 1900-01-01

## 2022-12-20 ENCOUNTER — APPOINTMENT (OUTPATIENT)
Dept: ORTHOPEDIC SURGERY | Facility: CLINIC | Age: 47
End: 2022-12-20

## 2022-12-20 PROCEDURE — 99024 POSTOP FOLLOW-UP VISIT: CPT

## 2022-12-20 NOTE — HISTORY OF PRESENT ILLNESS
[de-identified] : The patient is a 47-year-old male here for a subsequent re-evaluation of his both knees.  He is status post right knee arthroscopy, medial meniscectomy sent to synovectomy and chondroplasty of the medial side of of the patella on 10/28/2022.   His right knee is improving.  He is still having pain in his left knee.  He is doing formal physical therapy for both knees.

## 2022-12-20 NOTE — PHYSICAL EXAM
[Bilateral] : knee bilaterally [] : patient ambulates without assistive device [FreeTextEntry9] :  Right knee flexion 0-125 degrees, full extension.  Left knee flexion 0-120 degrees, full extension.

## 2022-12-20 NOTE — DISCUSSION/SUMMARY
[de-identified] : Today I recommend he continues formal physical therapy for both knees, Rx provided for that.  He will use nabumetone for pain and tramadol for breakthrough pain new Rx for tramadol sent to the pharmacy.  He will come back in 6 weeks for further evaluation.  He is unable work, 100% temporary disabled.\par \par Supervising physician:

## 2022-12-21 ENCOUNTER — OUTPATIENT (OUTPATIENT)
Dept: OUTPATIENT SERVICES | Facility: HOSPITAL | Age: 47
LOS: 1 days | Discharge: HOME | End: 2022-12-21
Payer: COMMERCIAL

## 2022-12-21 DIAGNOSIS — R06.2 WHEEZING: ICD-10-CM

## 2022-12-21 DIAGNOSIS — Z98.890 OTHER SPECIFIED POSTPROCEDURAL STATES: Chronic | ICD-10-CM

## 2022-12-21 PROCEDURE — 94060 EVALUATION OF WHEEZING: CPT | Mod: 26

## 2022-12-21 PROCEDURE — 94727 GAS DIL/WSHOT DETER LNG VOL: CPT | Mod: 26

## 2022-12-21 PROCEDURE — 94729 DIFFUSING CAPACITY: CPT | Mod: 26

## 2023-01-19 ENCOUNTER — FORM ENCOUNTER (OUTPATIENT)
Age: 48
End: 2023-01-19

## 2023-01-21 NOTE — ED PROVIDER NOTE - NS_EDPROVIDERDISPOUSERTYPE_ED_A_ED
Never smoker Scribe Attestation (For Scribes USE Only)... Scribe Attestation (For Scribes USE Only).../Attending Attestation (For Attendings USE Only)...

## 2023-01-24 ENCOUNTER — APPOINTMENT (OUTPATIENT)
Dept: PULMONOLOGY | Facility: CLINIC | Age: 48
End: 2023-01-24
Payer: COMMERCIAL

## 2023-01-24 VITALS
BODY MASS INDEX: 39.33 KG/M2 | RESPIRATION RATE: 14 BRPM | DIASTOLIC BLOOD PRESSURE: 70 MMHG | HEART RATE: 99 BPM | SYSTOLIC BLOOD PRESSURE: 110 MMHG | OXYGEN SATURATION: 96 % | WEIGHT: 282 LBS

## 2023-01-24 DIAGNOSIS — G47.33 OBSTRUCTIVE SLEEP APNEA (ADULT) (PEDIATRIC): ICD-10-CM

## 2023-01-24 DIAGNOSIS — J45.909 UNSPECIFIED ASTHMA, UNCOMPLICATED: ICD-10-CM

## 2023-01-24 PROCEDURE — 99214 OFFICE O/P EST MOD 30 MIN: CPT

## 2023-01-24 NOTE — HISTORY OF PRESENT ILLNESS
[TextBox_4] : He received the APAP machine and he has been trying to use it but been having problems getting at used to the nasal pillow\par Also he did not tolerate a full facemask before\par Told him about the [employer]ear facemask to see if he can able to tolerate it\par He is status post PFT result reviewed within normal\par Chest x-ray reviewed normal\par He feels good no cough or wheeze or shortness of breath no need for rescue inhaler

## 2023-01-24 NOTE — REVIEW OF SYSTEMS
[Fatigue] : fatigue [Chest Tightness] : no chest tightness [Wheezing] : no wheezing [SOB on Exertion] : no sob on exertion [Negative] : Endocrine

## 2023-01-30 ENCOUNTER — NON-APPOINTMENT (OUTPATIENT)
Age: 48
End: 2023-01-30

## 2023-01-31 ENCOUNTER — APPOINTMENT (OUTPATIENT)
Dept: ORTHOPEDIC SURGERY | Facility: CLINIC | Age: 48
End: 2023-01-31
Payer: OTHER MISCELLANEOUS

## 2023-01-31 PROCEDURE — 99072 ADDL SUPL MATRL&STAF TM PHE: CPT

## 2023-01-31 PROCEDURE — 99213 OFFICE O/P EST LOW 20 MIN: CPT | Mod: ACP

## 2023-01-31 NOTE — DISCUSSION/SUMMARY
[de-identified] :   At this point I recommend he continues home therapy exercises for both knees.  The left knee also has had non operative management of anti-inflammatories, stretching and strengthening.  I will speak with Dr. Lainez regarding this.  Right now he is unable to work 100% temporary disabled.\par \par Supervising physician:

## 2023-01-31 NOTE — HISTORY OF PRESENT ILLNESS
[de-identified] : The patient is a 47-year-old male here for subsequent re-evaluation of both knees.  His right knee is doing well.  His left knee is hurting him each day.  He is doing home therapy exercises for his left knee because therapy was never approved.  The patient states we do have approval for a left knee arthroscopy.  He is very interested in booking surgery to address his left knee pain.  He states he has seen the worker's compensation [facility] physician in 2 days.

## 2023-01-31 NOTE — PHYSICAL EXAM
[Bilateral] : knee bilaterally [] : non-antalgic [FreeTextEntry8] :  Significant medial joint tenderness to palpation over the left knee.  Mild medial joint line tenderness to palpation over the right knee. [FreeTextEntry9] :   Left knee flexion 90°, full extension, right knee flexion 120°, full extension. [de-identified] :   Positive Stephany's left knee.

## 2023-02-22 ENCOUNTER — FORM ENCOUNTER (OUTPATIENT)
Age: 48
End: 2023-02-22

## 2023-03-03 ENCOUNTER — APPOINTMENT (OUTPATIENT)
Dept: ORTHOPEDIC SURGERY | Facility: AMBULATORY SURGERY CENTER | Age: 48
End: 2023-03-03
Payer: OTHER MISCELLANEOUS

## 2023-03-03 ENCOUNTER — RX RENEWAL (OUTPATIENT)
Age: 48
End: 2023-03-03

## 2023-03-03 PROCEDURE — 29879 ARTHRS KNE SRG ABRASJ ARTHRP: CPT | Mod: LT

## 2023-03-03 RX ORDER — NABUMETONE 750 MG/1
750 TABLET, FILM COATED ORAL
Qty: 60 | Refills: 0 | Status: ACTIVE | COMMUNITY
Start: 2022-08-29 | End: 1900-01-01

## 2023-03-08 ENCOUNTER — APPOINTMENT (OUTPATIENT)
Dept: ORTHOPEDIC SURGERY | Facility: CLINIC | Age: 48
End: 2023-03-08
Payer: OTHER MISCELLANEOUS

## 2023-03-08 ENCOUNTER — NON-APPOINTMENT (OUTPATIENT)
Age: 48
End: 2023-03-08

## 2023-03-08 PROCEDURE — 99024 POSTOP FOLLOW-UP VISIT: CPT

## 2023-03-08 NOTE — DISCUSSION/SUMMARY
[de-identified] : Today the sutures were removed, Steri-Strips were placed.  He can weight-bear as tolerated.  I recommend formal physical therapy.  Please send authorization for that.  He may return to work full duty, mild degree of disability on 4/2/2023.  Until then he will be out of work 100% temporary disabled.  I will see him back in 6 weeks for further evaluation.\par \par Supervising Physician: Dr. Lainez

## 2023-03-08 NOTE — HISTORY OF PRESENT ILLNESS
[de-identified] : The patient is a 47-year-old male here for a subsequent reevaluation of his left knee.  He is status post left knee arthroscopy, microfracture to the trochlea, chondroplasty and extensive synovectomy on 3/3/2023.  This is his initial postoperative visit.  He reports that his knee is feeling better.

## 2023-03-08 NOTE — PHYSICAL EXAM
[Left] : left knee [] : patient ambulates without assistive device [FreeTextEntry3] : Surgical portals are healing well.  Clean, dry, intact.  No surrounding erythema or drainage noted. [FreeTextEntry8] : No calf pain, negative Homans' sign. [FreeTextEntry9] : Flexion to 60 degrees, full extension

## 2023-03-11 ENCOUNTER — RX RENEWAL (OUTPATIENT)
Age: 48
End: 2023-03-11

## 2023-03-11 RX ORDER — TRAMADOL HYDROCHLORIDE 50 MG/1
50 TABLET, COATED ORAL
Qty: 10 | Refills: 0 | Status: ACTIVE | COMMUNITY
Start: 2022-10-28 | End: 1900-01-01

## 2023-03-16 ENCOUNTER — FORM ENCOUNTER (OUTPATIENT)
Age: 48
End: 2023-03-16

## 2023-04-17 ENCOUNTER — APPOINTMENT (OUTPATIENT)
Dept: ORTHOPEDIC SURGERY | Facility: CLINIC | Age: 48
End: 2023-04-17
Payer: OTHER MISCELLANEOUS

## 2023-04-17 PROCEDURE — 99024 POSTOP FOLLOW-UP VISIT: CPT

## 2023-04-17 NOTE — HISTORY OF PRESENT ILLNESS
[de-identified] : The patient is a 47-year-old male here for a subsequent reevaluation of his left knee.  He is status post left knee arthroscopy, microfracture to the trochlea, chondroplasty and extensive synovectomy on 3/3/2023.  His knee is doing much better.  He did do physical therapy and has 2 more sessions left.  He is working full duty at this point.

## 2023-04-17 NOTE — DISCUSSION/SUMMARY
[de-identified] : At this point he is doing very well, he will have the remaining formal physical therapy sessions and convert to a home program.  He is working full duty, mild degree of disability.  We will see him back on as-needed basis for his left knee.\par \par Supervising Physician: Dr. Lainez

## 2023-04-17 NOTE — PHYSICAL EXAM
[Left] : left knee [NL (0)] : extension 0 degrees [] : patient ambulates without assistive device [FreeTextEntry8] : No calf pain, negative Homans' sign. [TWNoteComboBox7] : flexion 120 degrees

## 2023-05-25 ENCOUNTER — FORM ENCOUNTER (OUTPATIENT)
Age: 48
End: 2023-05-25

## 2023-09-03 NOTE — ED ADULT TRIAGE NOTE - BP NONINVASIVE SYSTOLIC (MM HG)
[facility]      Patient:  Ashley Larry  YOB: 1959  Date of Service: 9/3/2023  MRN: [id number]   Acct: [de-identified]   [id number]

## 2023-09-14 ENCOUNTER — EMERGENCY (EMERGENCY)
Facility: HOSPITAL | Age: 48
LOS: 0 days | Discharge: HOME | End: 2023-09-14
Attending: EMERGENCY MEDICINE
Payer: COMMERCIAL

## 2023-09-14 VITALS
RESPIRATION RATE: 18 BRPM | TEMPERATURE: 98 F | WEIGHT: 259.93 LBS | SYSTOLIC BLOOD PRESSURE: 139 MMHG | DIASTOLIC BLOOD PRESSURE: 82 MMHG | OXYGEN SATURATION: 98 % | HEART RATE: 67 BPM

## 2023-09-14 DIAGNOSIS — M79.89 OTHER SPECIFIED SOFT TISSUE DISORDERS: ICD-10-CM

## 2023-09-14 DIAGNOSIS — Z88.5 ALLERGY STATUS TO NARCOTIC AGENT: ICD-10-CM

## 2023-09-14 DIAGNOSIS — J45.909 UNSPECIFIED ASTHMA, UNCOMPLICATED: ICD-10-CM

## 2023-09-14 DIAGNOSIS — K21.9 GASTRO-ESOPHAGEAL REFLUX DISEASE WITHOUT ESOPHAGITIS: ICD-10-CM
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
## 2023-09-14 DIAGNOSIS — I10 ESSENTIAL (PRIMARY) HYPERTENSION: ICD-10-CM

## 2023-09-14 DIAGNOSIS — E78.5 HYPERLIPIDEMIA, UNSPECIFIED: ICD-10-CM
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
## 2023-09-14 DIAGNOSIS — S99.921A UNSPECIFIED INJURY OF RIGHT FOOT, INITIAL ENCOUNTER: ICD-10-CM

## 2023-09-14 DIAGNOSIS — Z98.890 OTHER SPECIFIED POSTPROCEDURAL STATES: Chronic | ICD-10-CM

## 2023-09-14 PROCEDURE — 29515 APPLICATION SHORT LEG SPLINT: CPT | Mod: RT

## 2023-09-14 PROCEDURE — 73630 X-RAY EXAM OF FOOT: CPT | Mod: 26,RT

## 2023-09-14 PROCEDURE — 99284 EMERGENCY DEPT VISIT MOD MDM: CPT | Mod: 25

## 2023-09-14 PROCEDURE — 99283 EMERGENCY DEPT VISIT LOW MDM: CPT | Mod: 25

## 2023-09-14 PROCEDURE — 73630 X-RAY EXAM OF FOOT: CPT | Mod: RT

## 2023-09-14 RX ORDER — ACETAMINOPHEN 500 MG
975 TABLET ORAL ONCE
Refills: 0 | Status: COMPLETED | OUTPATIENT
Start: 2023-09-14 | End: 2023-09-14

## 2023-09-14 RX ADMIN — Medication 975 MILLIGRAM(S): at 22:31

## 2023-09-14 NOTE — ED PROVIDER NOTE - ATTENDING APP SHARED VISIT CONTRIBUTION OF CARE
48-year-old male past medical history noted presents for evaluation of right foot pain.  Patient states that when he is laying down to 2 blocks on his foot.  Exam patient NAD, AOx3, positive swelling to right foot dorsal aspect over first metatarsal carpal and base of great toe, no bruising, positive DP pulses, ankle nontender, skin intact

## 2023-09-14 NOTE — ED ADULT NURSE NOTE - NSFALLUNIVINTERV_ED_ALL_ED
Bed/Stretcher in lowest position, wheels locked, appropriate side rails in place/Call bell, personal items and telephone in reach/Instruct patient to call for assistance before getting out of bed/chair/stretcher/Non-slip footwear applied when patient is off stretcher/[place] to call system/Physically safe environment - no spills, clutter or unnecessary equipment/Purposeful proactive rounding/Room/bathroom lighting operational, light cord in reach

## 2023-09-14 NOTE — ED PROVIDER NOTE - PHYSICAL EXAMINATION
GENERAL: Well-nourished, Well-developed. NAD.  HEAD: No visible or palpable bumps or hematomas. No ecchymosis behind ears B/L.  Eyes: PERRLA, EOMI.  Neck: Supple. FROM  CVS: RRR  MSK: Extremities w/o deformity. (+)R foot dorsal aspect swelling with ttp. FROM R foot and ankle.   Skin: Warm, Dry. No rashes or lesions. Good cap refill < 2 sec B/L.  EXT: Radial and pedal pulses present B/L. No calf tenderness or swelling B/L. No palpable cords. No pedal edema B/L.  Neuro: NVI

## 2023-09-14 NOTE — ED PROVIDER NOTE - CARE PROVIDER_API CALL
Sunny Freeman  Podiatric Medicine and Surgery  [address], [address], [address]  [place] [address]  Phone: [phone]  Fax: [phone]  Follow Up Time:

## 2023-09-14 NOTE — ED PROVIDER NOTE - PATIENT PORTAL LINK FT
You can access the FollowMyHealth Patient Portal offered by [facility] by registering at the following website: [email or web address]. By joining [facility]’s FollowMyHealth portal, you will also be able to view your health information using other applications (apps) compatible with our system.

## 2023-09-14 NOTE — ED ADULT TRIAGE NOTE - RESPIRATORY RATE (BREATHS/MIN)
I spoke with the patient at the home number listed to discuss the message from Dr. Palomo Trevino below. The patient stated that she will have new insurance as of 3-1-2023 and will call to schedule an office visit once she has her new insurance card and effective date. I will close this phone note. 18

## 2023-09-14 NOTE — ED PROVIDER NOTE - OBJECTIVE STATEMENT
48-year-old male past medical history of hyperlipidemia, hypertension, GERD presenting to ED for right foot injury tonight.  Patient states he dropped a tool box on his right foot, shortly after noticed swelling and pain to dorsal aspect of right foot.  Patient is using a cane to ambulate due to pain with bearing weight.  Denies any other injuries.  Denies numbness, tingling or weakness.

## 2023-09-18 ENCOUNTER — APPOINTMENT (OUTPATIENT)
Dept: ORTHOPEDIC SURGERY | Facility: CLINIC | Age: 48
End: 2023-09-18

## 2023-11-06 ENCOUNTER — LABORATORY RESULT (OUTPATIENT)
Age: 48
End: 2023-11-06

## 2023-11-07 ENCOUNTER — APPOINTMENT (OUTPATIENT)
Dept: UROLOGY | Facility: CLINIC | Age: 48
End: 2023-11-07
Payer: COMMERCIAL

## 2023-11-07 VITALS
HEIGHT: 71 IN | WEIGHT: 273 LBS | TEMPERATURE: 98 F | BODY MASS INDEX: 38.22 KG/M2 | HEART RATE: 78 BPM | DIASTOLIC BLOOD PRESSURE: 73 MMHG | SYSTOLIC BLOOD PRESSURE: 144 MMHG

## 2023-11-07 DIAGNOSIS — R10.9 UNSPECIFIED ABDOMINAL PAIN: ICD-10-CM

## 2023-11-07 PROCEDURE — 81003 URINALYSIS AUTO W/O SCOPE: CPT | Mod: QW

## 2023-11-07 PROCEDURE — 99204 OFFICE O/P NEW MOD 45 MIN: CPT | Mod: 25

## 2023-11-07 RX ORDER — IBUPROFEN 600 MG/1
600 TABLET, FILM COATED ORAL TWICE DAILY
Qty: 14 | Refills: 0 | Status: ACTIVE | COMMUNITY
Start: 2023-11-07 | End: 1900-01-01

## 2023-11-07 RX ORDER — PHENTERMINE HYDROCHLORIDE 37.5 MG/1
37.5 CAPSULE ORAL
Refills: 0 | Status: ACTIVE | COMMUNITY

## 2023-11-07 RX ORDER — FAMOTIDINE 40 MG/1
40 TABLET, FILM COATED ORAL
Refills: 0 | Status: ACTIVE | COMMUNITY

## 2023-11-07 RX ORDER — LEVOCETIRIZINE DIHYDROCHLORIDE 5 MG/1
TABLET, FILM COATED ORAL
Refills: 0 | Status: ACTIVE | COMMUNITY

## 2023-11-13 ENCOUNTER — NON-APPOINTMENT (OUTPATIENT)
Age: 48
End: 2023-11-13

## 2023-11-13 LAB
BACTERIA UR CULT: NORMAL
BILIRUB UR QL STRIP: NORMAL
COLLECTION METHOD: NORMAL
GLUCOSE UR-MCNC: NORMAL
HCG UR QL: 0.2 EU/DL
HGB UR QL STRIP.AUTO: NORMAL
KETONES UR-MCNC: NORMAL
LEUKOCYTE ESTERASE UR QL STRIP: NORMAL
NITRITE UR QL STRIP: NORMAL
PH UR STRIP: 8
PROT UR STRIP-MCNC: NORMAL
SP GR UR STRIP: 1.01

## 2023-11-24 ENCOUNTER — RESULT REVIEW (OUTPATIENT)
Age: 48
End: 2023-11-24

## 2023-11-24 ENCOUNTER — OUTPATIENT (OUTPATIENT)
Dept: OUTPATIENT SERVICES | Facility: HOSPITAL | Age: 48
LOS: 1 days | End: 2023-11-24
Payer: COMMERCIAL

## 2023-11-24 DIAGNOSIS — N20.0 CALCULUS OF KIDNEY: ICD-10-CM

## 2023-11-24 DIAGNOSIS — Z98.890 OTHER SPECIFIED POSTPROCEDURAL STATES: Chronic | ICD-10-CM

## 2023-11-24 DIAGNOSIS — N50.89 OTHER SPECIFIED DISORDERS OF THE MALE GENITAL ORGANS: ICD-10-CM

## 2023-11-24 DIAGNOSIS — Z00.8 ENCOUNTER FOR OTHER GENERAL EXAMINATION: ICD-10-CM

## 2023-11-24 PROCEDURE — 74176 CT ABD & PELVIS W/O CONTRAST: CPT | Mod: 26

## 2023-11-24 PROCEDURE — 76870 US EXAM SCROTUM: CPT | Mod: 26

## 2023-11-24 PROCEDURE — 76870 US EXAM SCROTUM: CPT

## 2023-11-24 PROCEDURE — 74176 CT ABD & PELVIS W/O CONTRAST: CPT

## 2023-11-25 DIAGNOSIS — N50.89 OTHER SPECIFIED DISORDERS OF THE MALE GENITAL ORGANS: ICD-10-CM

## 2023-11-25 DIAGNOSIS — N20.0 CALCULUS OF KIDNEY: ICD-10-CM

## 2023-11-30 ENCOUNTER — NON-APPOINTMENT (OUTPATIENT)
Age: 48
End: 2023-11-30

## 2023-12-07 ENCOUNTER — RX RENEWAL (OUTPATIENT)
Age: 48
End: 2023-12-07

## 2023-12-07 RX ORDER — FLUTICASONE FUROATE AND VILANTEROL TRIFENATATE 100; 25 UG/1; UG/1
100-25 POWDER RESPIRATORY (INHALATION)
Qty: 1 | Refills: 2 | Status: ACTIVE | COMMUNITY
Start: 2022-02-04 | End: 1900-01-01

## 2023-12-22 NOTE — ED ADULT TRIAGE NOTE - PRO INTERPRETER NEED 2
English Price (Do Not Change): 0.00 Detail Level: Simple Instructions: This plan will send the code FBSE to the PM system.  DO NOT or CHANGE the price.

## 2023-12-28 ENCOUNTER — APPOINTMENT (OUTPATIENT)
Dept: SURGERY | Facility: CLINIC | Age: 48
End: 2023-12-28
Payer: COMMERCIAL

## 2023-12-28 VITALS — WEIGHT: 274 LBS | BODY MASS INDEX: 38.36 KG/M2 | HEIGHT: 71 IN

## 2023-12-28 DIAGNOSIS — E66.09 OTHER OBESITY DUE TO EXCESS CALORIES: ICD-10-CM

## 2023-12-28 DIAGNOSIS — M62.08 SEPARATION OF MUSCLE (NONTRAUMATIC), OTHER SITE: ICD-10-CM

## 2023-12-28 PROCEDURE — 99203 OFFICE O/P NEW LOW 30 MIN: CPT

## 2023-12-28 NOTE — PHYSICAL EXAM
[JVD] : no jugular venous distention  [Normal Breath Sounds] : Normal breath sounds [No Rash or Lesion] : No rash or lesion [Alert] : alert [Calm] : calm [de-identified] : Overweight [de-identified] : Normal [de-identified] : Protuberant abdomen, moderate to large rectus diastasis [de-identified] : No hernia recurrence

## 2023-12-28 NOTE — CONSULT LETTER
[Dear  ___] : Dear  [unfilled], [Courtesy Letter:] : I had the pleasure of seeing your patient, [unfilled], in my office today. [Please see my note below.] : Please see my note below. [Consult Closing:] : Thank you very much for allowing me to participate in the care of this patient.  If you have any questions, please do not hesitate to contact me. [FreeTextEntry3] : Respectfully,  Jorge Velazquez M.D., FACS

## 2023-12-28 NOTE — ASSESSMENT
[FreeTextEntry1] : Chava is a pleasant 48-year-old  with a past medical history significant for asthma, hypercholesterolemia and obesity along with multiple musculoskeletal surgeries in the past including an umbilical hernia repair with mesh in 2011 by a surgeon in [place] now concerned about upper abdominal swelling suspicious for hernia.  He often does heavy lifting and strenuous activity at work.  He is currently in the process of trying to lose weight.  Physical examination demonstrates a large protuberant abdomen with a moderate to large rectus diastasis with no evidence of a hernia recurrence in the periumbilical region.  His rectus diastasis is likely related to his excess abdominal weight and protuberant abdomen.  He does have some tenderness to deep palpation in the umbilicus which is not uncommon.  His current BMI is 38.  Chava was counseled and reassured.  No surgical intervention is warranted at this time.  We spoke about the etiology and natural progression of rectus diastasis and the importance of wearing an abdominal binder during any significant physical activity. He was also encouraged to avoid sit-ups and crunches, and was given educational materials offering alternative exercises to consider, including the Tupler technique which are specialized exercises using abdominal splints/binders designed to reverse diastasis recti nonoperatively.  This condition will often resolve or improve spontaneously with loss of abdominal weight and we discussed the importance of calorie restriction, healthy eating and aerobic exercise in this regard.  He was encouraged to return to me in the future if he develops any signs or symptoms consistent with either a new or recurrent hernia.

## 2024-01-04 ENCOUNTER — APPOINTMENT (OUTPATIENT)
Dept: UROLOGY | Facility: CLINIC | Age: 49
End: 2024-01-04
Payer: COMMERCIAL

## 2024-01-04 DIAGNOSIS — K76.9 LIVER DISEASE, UNSPECIFIED: ICD-10-CM

## 2024-01-04 DIAGNOSIS — N50.89 OTHER SPECIFIED DISORDERS OF THE MALE GENITAL ORGANS: ICD-10-CM

## 2024-01-04 PROCEDURE — 99215 OFFICE O/P EST HI 40 MIN: CPT

## 2024-01-04 NOTE — ADDENDUM
[FreeTextEntry1] : Patient's note was transcribed with the assistance of a medical scribe under the supervision of Dr. Bowling. I, Dr. Bowling, have reviewed the patient's chart and agree that it aligns with my medical decisions. Bessy Campoverde, our scribe, also served as a chaperone for physical examination purposes.  The submitted E/M billing level for this visit reflects the total time spent on the day of the visit including face-to-face time spent with the patient, non-face-to-face review of medical records and relevant information, documentation, and asynchronous communication with the patient after a visit via phone, email, or patients EHR portal after the visit.  The medical records reviewed are either scanned into the chart or reviewed with the patient using a patients electronic medical records portal for patients with records not available to [facility] via electronic transmission platforms from other institutions and labs.  Time spend counseling and performing coordination of care was also included in determining the appropriate EM billing level. and time spent review of old records

## 2024-01-04 NOTE — ASSESSMENT
[FreeTextEntry1] : FABIAN DELGADO is a 48-year-old male with hx of nephrolithiasis and prior ESWL, presents for consultation for bilateral intermittent flank pain, pain with ejaculation, possible scrotal lump. Moderately tender on prostate exam With culture confirmed bacterial prostatitis improved symptoms after 3-week course of Augmentin, now with recurrence of symptoms.  Chronic punctate nephrolithiasis  Plan - repeat UA, UCx today - resume 3-week Augmentin antibiotics given pt's improvement. total 6 week course.  - ordered MRI Abdomen to assess hepatic lesion and f/u with PCP. - observation of non-obstructing punctate stone - stone dietary prevention - f/u 6 weeks nsaids prn if pt has persistent symptoms in future can consider cysto to rule out urethral stone due to prostate calcification.

## 2024-01-04 NOTE — HISTORY OF PRESENT ILLNESS
[FreeTextEntry1] : FABIAN DELGADO is a 48-year-old male with hx of nephrolithiasis and prior ESWL, presents for consultation for bilateral intermittent flank pain, pain with ejaculation, possible scrotal lump. Moderately tender on prostate exam  Pt started antibiotic course for +UCx and reported improvement with penile pain upon ejaculation however the symptoms reoccurred once he finished the course. Denies flank pain, gross hematuria, dysuria or associated symptoms.  Patient brought in old records today from his urologist when he had shockwave the trips he and these were reviewed.  He had a liver lesion at the time perhaps this is the same lesion.  CT AP No Cont 11/24/2023 - images independently reviewed by me - On my read, no ureteral stones seen or hydronephrosis. Agree bilateral punctate nephrolithiasis. Prostate calcifications seen. IMPRESSION: Punctate nonobstructing bilateral intrarenal calculi as above; no hydronephrosis or obstructing nephroureteral calculi. Incompletely characterized 1.8 cm hypodense hepatic lesion. Further evaluation with a contrast enhanced MRI abdomen is recommended. Colonic diverticulosis without evidence for acute diverticulitis. (KIDNEYS: Punctate nonobstructing left lower pole renal calculi measuring 0.1-0.2 cm. Nonobstructing 0.2 cm right upper pole renal calculus. No obstructing nephroureteral calculi or hydronephrosis bilaterally.)  Testicle US 11/24/2023 - images independently reviewed by me IMPRESSION: No testicular or scrotal masses. Tiny right hydrocele with a volume of 2.2 mL.  UA 11/07/2023 - 0 WBCs, 2 RBCs.  UCx - <10,000 CFU/ml Enterococcus faecalis <10,000 CFU/ml Escherichia coli  previously,  Pt c/o penile pain upon ejaculation, states this has been present approximately 1 month.    Has had increase in lower urinary tract symptoms, over the last few months he reports increased terminal dribbling after voiding and increased nocturia 5-6x. He does not have hematospermia. Denies flank pain, gross hematuria, dysuria or associated symptoms. Also states that he has felt a lump in the left side of the scrotum.    history: Prior episode of kidney stone over 10 years ago, likely had an ESWL. Denies recurrent UTIs. Family History: denies  malignancies. Social History: city ,  PSHx: abd hernia repair

## 2024-01-16 ENCOUNTER — NON-APPOINTMENT (OUTPATIENT)
Age: 49
End: 2024-01-16

## 2024-01-16 LAB — BACTERIA UR CULT: NORMAL

## 2024-01-27 ENCOUNTER — OUTPATIENT (OUTPATIENT)
Dept: OUTPATIENT SERVICES | Facility: HOSPITAL | Age: 49
LOS: 1 days | End: 2024-01-27
Payer: COMMERCIAL

## 2024-01-27 ENCOUNTER — RESULT REVIEW (OUTPATIENT)
Age: 49
End: 2024-01-27

## 2024-01-27 DIAGNOSIS — Z98.890 OTHER SPECIFIED POSTPROCEDURAL STATES: Chronic | ICD-10-CM

## 2024-01-27 DIAGNOSIS — N20.0 CALCULUS OF KIDNEY: ICD-10-CM

## 2024-01-27 PROCEDURE — 74183 MRI ABD W/O CNTR FLWD CNTR: CPT | Mod: 26

## 2024-01-27 PROCEDURE — A9579: CPT

## 2024-01-27 PROCEDURE — 74183 MRI ABD W/O CNTR FLWD CNTR: CPT

## 2024-01-28 DIAGNOSIS — N20.0 CALCULUS OF KIDNEY: ICD-10-CM

## 2024-02-02 ENCOUNTER — NON-APPOINTMENT (OUTPATIENT)
Age: 49
End: 2024-02-02

## 2024-02-08 NOTE — ED PROVIDER NOTE - ATTESTATION, MLM
92 I have reviewed and confirmed nurses' notes for patient's medications, allergies, medical history, and surgical history.

## 2024-02-15 ENCOUNTER — APPOINTMENT (OUTPATIENT)
Dept: UROLOGY | Facility: CLINIC | Age: 49
End: 2024-02-15
Payer: COMMERCIAL

## 2024-02-15 VITALS
BODY MASS INDEX: 38.94 KG/M2 | SYSTOLIC BLOOD PRESSURE: 143 MMHG | WEIGHT: 272 LBS | OXYGEN SATURATION: 94 % | TEMPERATURE: 98 F | DIASTOLIC BLOOD PRESSURE: 83 MMHG | HEART RATE: 88 BPM | HEIGHT: 70 IN

## 2024-02-15 DIAGNOSIS — N41.0 ACUTE PROSTATITIS: ICD-10-CM

## 2024-02-15 PROCEDURE — 99214 OFFICE O/P EST MOD 30 MIN: CPT | Mod: 25

## 2024-02-15 PROCEDURE — G2211 COMPLEX E/M VISIT ADD ON: CPT

## 2024-02-15 PROCEDURE — 81003 URINALYSIS AUTO W/O SCOPE: CPT | Mod: QW

## 2024-02-15 NOTE — PHYSICAL EXAM
[General Appearance - Well Developed] : well developed [General Appearance - Well Nourished] : well nourished [Normal Appearance] : normal appearance [Well Groomed] : well groomed [General Appearance - In No Acute Distress] : no acute distress [Respiration, Rhythm And Depth] : normal respiratory rhythm and effort [Exaggerated Use Of Accessory Muscles For Inspiration] : no accessory muscle use [Abdomen Soft] : soft [Abdomen Tenderness] : non-tender [Costovertebral Angle Tenderness] : no ~M costovertebral angle tenderness [Normal Station and Gait] : the gait and station were normal for the patient's age [] : no rash [No Focal Deficits] : no focal deficits [Oriented To Time, Place, And Person] : oriented to person, place, and time [Affect] : the affect was normal [Mood] : the mood was normal [Not Anxious] : not anxious

## 2024-02-16 ENCOUNTER — NON-APPOINTMENT (OUTPATIENT)
Age: 49
End: 2024-02-16

## 2024-02-16 LAB
BILIRUB UR QL STRIP: NEGATIVE
COLLECTION METHOD: NORMAL
GLUCOSE UR-MCNC: NEGATIVE
HCG UR QL: 0.2 EU/DL
HGB UR QL STRIP.AUTO: NEGATIVE
KETONES UR-MCNC: NEGATIVE
LEUKOCYTE ESTERASE UR QL STRIP: NEGATIVE
NITRITE UR QL STRIP: NEGATIVE
PH UR STRIP: 5.5
PROT UR STRIP-MCNC: NEGATIVE
SP GR UR STRIP: 1.03

## 2024-02-17 NOTE — ASSESSMENT
[FreeTextEntry1] : FABIAN DELGADO is a 48-year-old male with hx of nephrolithiasis and prior ESWL, presents for consultation for bilateral intermittent flank pain, pain with ejaculation, possible scrotal lump. Moderately tender on prostate exam With culture confirmed bacterial prostatitis improved symptoms after two 3 week courses of Augmentin, Chronic punctate nephrolithiasis  Improved after Augmentin however, some lingering symptoms.  Reviewed MRI which demonstrates benign hemangioma, follow-up PCP.  Prior CT demonstrated questionable prostatic calcifications.  History of nephrolithiasis.  Plan: -If symptoms continue consider cystoscopy at next visit to rule out urethral obstruction -UA C&S today

## 2024-02-17 NOTE — ADDENDUM
[FreeTextEntry1] : Agree with the above documentation as outlined by the PA which I have addended as necessary.  The submitted E/M billing level for this visit reflects the total time spent on the day of the visit including face-to-face time spent with the patient, non-face-to-face review of medical records and relevant information, documentation, and asynchronous communication with the patient after a visit via phone, email, or patients EHR portal after the visit.  The medical records reviewed are either scanned into the chart or reviewed with the patient using a patients electronic medical records portal for patients with records not available to [facility] via electronic transmission platforms from other institutions and labs.  Time spend counseling and performing coordination of care was also included in determining the appropriate EM billing level.

## 2024-02-17 NOTE — HISTORY OF PRESENT ILLNESS
[FreeTextEntry1] : FABIAN DELGADO is a 48-year-old male with hx of nephrolithiasis and prior ESWL, presents for consultation for bilateral intermittent flank pain, pain with ejaculation, possible scrotal lump. Moderately tender on prostate exam  At his last visit, he was started on Augmentin for 3 weeks secondary to tender prostate on examination and pain with ejaculation.  After he finished antibiotic he admits to a near resolution of his symptoms.  He still having some ejaculatory discomfort, which is intermittent in nature.  He still having some frequency/urgency with occasional nocturia.  We reviewed his prior CT which demonstrated questionable liver lesion as well as prostatic calcifications.  Denies fever, chills, gross hematuria, hematospermia, testicular discomfort, or further urological/constitutional symptoms.  MRI of the abdomen with and without IV contrast, lesion was 1.5 cm benign hemangioma.  He is following up with PCP regarding this issue.  Urinalysis with culture/sensitivity, from 1/5/2024, is negative for any abnormalities or bacterial growth.  Previously:  Pt started antibiotic course for +UCx and reported improvement with penile pain upon ejaculation however the symptoms reoccurred once he finished the course. Denies flank pain, gross hematuria, dysuria or associated symptoms.  Patient brought in old records today from his urologist when he had shockwave the trips he and these were reviewed.  He had a liver lesion at the time perhaps this is the same lesion.  CT AP No Cont 11/24/2023 - On my read, no ureteral stones seen or hydronephrosis. Agree bilateral punctate nephrolithiasis. Prostate calcifications seen. IMPRESSION: Punctate nonobstructing bilateral intrarenal calculi as above; no hydronephrosis or obstructing nephroureteral calculi. Incompletely characterized 1.8 cm hypodense hepatic lesion. Further evaluation with a contrast enhanced MRI abdomen is recommended. Colonic diverticulosis without evidence for acute diverticulitis. (KIDNEYS: Punctate nonobstructing left lower pole renal calculi measuring 0.1-0.2 cm. Nonobstructing 0.2 cm right upper pole renal calculus. No obstructing nephroureteral calculi or hydronephrosis bilaterally.)  Testicle US 11/24/2023 - images independently reviewed by me IMPRESSION: No testicular or scrotal masses. Tiny right hydrocele with a volume of 2.2 mL.  UA 11/07/2023 - 0 WBCs, 2 RBCs.  UCx - <10,000 CFU/ml Enterococcus faecalis <10,000 CFU/ml Escherichia coli  previously,  Pt c/o penile pain upon ejaculation, states this has been present approximately 1 month.    Has had increase in lower urinary tract symptoms, over the last few months he reports increased terminal dribbling after voiding and increased nocturia 5-6x. He does not have hematospermia. Denies flank pain, gross hematuria, dysuria or associated symptoms. Also states that he has felt a lump in the left side of the scrotum.    history: Prior episode of kidney stone over 10 years ago, likely had an ESWL. Denies recurrent UTIs. Family History: denies  malignancies. Social History: city ,  PSHx: abd hernia repair

## 2024-02-21 ENCOUNTER — NON-APPOINTMENT (OUTPATIENT)
Age: 49
End: 2024-02-21

## 2024-02-21 LAB — BACTERIA UR CULT: NORMAL

## 2024-03-13 ENCOUNTER — APPOINTMENT (OUTPATIENT)
Dept: ORTHOPEDIC SURGERY | Facility: CLINIC | Age: 49
End: 2024-03-13
Payer: OTHER MISCELLANEOUS

## 2024-03-13 DIAGNOSIS — S83.232D COMPLEX TEAR OF MEDIAL MENISCUS, CURRENT INJURY, LEFT KNEE, SUBSEQUENT ENCOUNTER: ICD-10-CM

## 2024-03-13 DIAGNOSIS — S83.231D COMPLEX TEAR OF MEDIAL MENISCUS, CURRENT INJURY, RIGHT KNEE, SUBSEQUENT ENCOUNTER: ICD-10-CM

## 2024-03-13 DIAGNOSIS — M94.262 CHONDROMALACIA, LEFT KNEE: ICD-10-CM

## 2024-03-13 PROCEDURE — 99245 OFF/OP CONSLTJ NEW/EST HI 55: CPT

## 2024-03-13 NOTE — HISTORY OF PRESENT ILLNESS
[de-identified] : cc bl knee patient is here for schedule effusion left and right knee  48-year-old male presents bilateral knee pain. He had right knee surgery on 10/28/2022 and then left knee surgery 3/3/2023. He is currently working.   on exam left knee goes from 0 to 108 degrees range of motion is, some patellofemoral crepitus given 20% for the loss of flexion additional 7.5% for the patella chondromalacia for total of 27.5% patient is currently working full duty

## 2024-04-04 ENCOUNTER — APPOINTMENT (OUTPATIENT)
Dept: UROLOGY | Facility: CLINIC | Age: 49
End: 2024-04-04
Payer: COMMERCIAL

## 2024-04-04 DIAGNOSIS — N46.9 MALE INFERTILITY, UNSPECIFIED: ICD-10-CM

## 2024-04-04 PROCEDURE — 99214 OFFICE O/P EST MOD 30 MIN: CPT | Mod: 25

## 2024-04-04 PROCEDURE — 52000 CYSTOURETHROSCOPY: CPT

## 2024-04-04 PROCEDURE — 81003 URINALYSIS AUTO W/O SCOPE: CPT | Mod: QW

## 2024-04-04 RX ORDER — ALFUZOSIN HYDROCHLORIDE 10 MG/1
10 TABLET, EXTENDED RELEASE ORAL
Qty: 90 | Refills: 3 | Status: ACTIVE | COMMUNITY
Start: 2024-04-04 | End: 1900-01-01

## 2024-04-04 NOTE — HISTORY OF PRESENT ILLNESS
[FreeTextEntry1] : FABIAN DELGADO is a 48-year-old male with hx of nephrolithiasis and prior ESWL, presents for consultation for bilateral intermittent flank pain, pain with ejaculation, possible scrotal lump. Moderately tender on prostate exam. With culture confirmed bacterial prostatitis improved symptoms after two 3 week courses of Augmentin, Chronic punctate nephrolithiasis.   Pt presents today for a cystoscopy. He no longer reports pain upon ejaculation.  However he reports bothersome LUTS which includes difficulty urinating with urinary frequency, weak stream occasionally and nocturia 4-5x. Denies flank pain, gross hematuria, dysuria or associated symptoms.  He also has concerns about infertility has been trying with his wife for approximately 8 years and has not had any children previously  UA 02/15/2024 - negative  UCx - normal urogenital roxnaa  previously MRI of the abdomen with and without IV contrast, lesion was 1.5 cm benign hemangioma.  He is following up with PCP regarding this issue.  Urinalysis with culture/sensitivity, from 1/5/2024, is negative for any abnormalities or bacterial growth.  CT AP No Cont 11/24/2023 - On my read, no ureteral stones seen or hydronephrosis. Agree bilateral punctate nephrolithiasis. Prostate calcifications seen. IMPRESSION: Punctate nonobstructing bilateral intrarenal calculi as above; no hydronephrosis or obstructing nephroureteral calculi. Incompletely characterized 1.8 cm hypodense hepatic lesion. Further evaluation with a contrast enhanced MRI abdomen is recommended. Colonic diverticulosis without evidence for acute diverticulitis. (KIDNEYS: Punctate nonobstructing left lower pole renal calculi measuring 0.1-0.2 cm. Nonobstructing 0.2 cm right upper pole renal calculus. No obstructing nephroureteral calculi or hydronephrosis bilaterally.)  Testicle US 11/24/2023 - images independently reviewed by me IMPRESSION: No testicular or scrotal masses. Tiny right hydrocele with a volume of 2.2 mL.  UA 11/07/2023 - 0 WBCs, 2 RBCs.  UCx - <10,000 CFU/ml Enterococcus faecalis <10,000 CFU/ml Escherichia coli    history: Prior episode of kidney stone over 10 years ago, likely had an ESWL. Denies recurrent UTIs. Family History: denies  malignancies. Social History: city ,  PSHx: abd hernia repair

## 2024-04-04 NOTE — ADDENDUM
[FreeTextEntry1] : Patient's note was transcribed with the assistance of a medical scribe under the supervision of Dr. Bowling. I, Dr. Bowling, have reviewed the patient's chart and agree that it aligns with my medical decisions. Bessy Campoverde, our scribe, also served as a chaperone for physical examination purposes.

## 2024-04-04 NOTE — ASSESSMENT
[FreeTextEntry1] : FABIAN DELGADO is a 48-year-old male with hx of nephrolithiasis and prior ESWL, presents for consultation for bilateral intermittent flank pain, pain with ejaculation, possible scrotal lump. Moderately tender on prostate exam. With culture confirmed bacterial prostatitis improved symptoms after two 3 week courses of Augmentin, Chronic punctate nephrolithiasis.  BPH with bothersome lower urinary tract symptoms, cystoscopy shows very mild intravesical protrusion of the prostate  We discussed options and patient wishes to start an alpha-blocker as he reports he is bothered by his lower urinary tract symptoms.  I do not recommend Flomax given the high rates of retrograde ejaculation and therefore he would like to start alfuzosin as he is still interested in preserving fertility.  - semen analysis  - start alfuzosin for LUTS, side effects discussed - f/u after to discuss results. uroflow pvr  -psa Observation of chronic nephrolithiasis  Re: alpha blocker-- The patient understands that this medication may cause dizziness, retrograde ejaculation or nasal congestion among other complications. I recommended that the patient take this medication at night. If the side effects become too bothersome, the medication can be discontinued. He does not have any future plans for eye surgery. 2017 17:29

## 2024-04-05 LAB
BILIRUB UR QL STRIP: NORMAL
COLLECTION METHOD: NORMAL
GLUCOSE UR-MCNC: NORMAL
HCG UR QL: 0.2 EU/DL
HGB UR QL STRIP.AUTO: NORMAL
KETONES UR-MCNC: NORMAL
LEUKOCYTE ESTERASE UR QL STRIP: NORMAL
NITRITE UR QL STRIP: NORMAL
PH UR STRIP: 5.5
PROT UR STRIP-MCNC: NORMAL
SP GR UR STRIP: 1.02

## 2024-04-05 RX ORDER — AMOXICILLIN AND CLAVULANATE POTASSIUM 875; 125 MG/1; MG/1
875-125 TABLET, COATED ORAL
Qty: 6 | Refills: 0 | Status: ACTIVE | COMMUNITY
Start: 2023-11-13 | End: 1900-01-01

## 2024-05-23 ENCOUNTER — APPOINTMENT (OUTPATIENT)
Dept: UROLOGY | Facility: CLINIC | Age: 49
End: 2024-05-23
Payer: COMMERCIAL

## 2024-05-23 VITALS
OXYGEN SATURATION: 98 % | SYSTOLIC BLOOD PRESSURE: 129 MMHG | HEART RATE: 77 BPM | BODY MASS INDEX: 38.94 KG/M2 | WEIGHT: 272 LBS | DIASTOLIC BLOOD PRESSURE: 82 MMHG | HEIGHT: 70 IN

## 2024-05-23 VITALS
HEART RATE: 84 BPM | BODY MASS INDEX: 38.94 KG/M2 | TEMPERATURE: 98 F | RESPIRATION RATE: 16 BRPM | HEIGHT: 70 IN | WEIGHT: 272 LBS | DIASTOLIC BLOOD PRESSURE: 85 MMHG | SYSTOLIC BLOOD PRESSURE: 134 MMHG | OXYGEN SATURATION: 97 %

## 2024-05-23 DIAGNOSIS — N53.12 PAINFUL EJACULATION: ICD-10-CM

## 2024-05-23 DIAGNOSIS — N13.8 BENIGN PROSTATIC HYPERPLASIA WITH LOWER URINARY TRACT SYMPMS: ICD-10-CM

## 2024-05-23 DIAGNOSIS — R35.1 NOCTURIA: ICD-10-CM

## 2024-05-23 DIAGNOSIS — R79.89 OTHER SPECIFIED ABNORMAL FINDINGS OF BLOOD CHEMISTRY: ICD-10-CM

## 2024-05-23 DIAGNOSIS — N40.1 BENIGN PROSTATIC HYPERPLASIA WITH LOWER URINARY TRACT SYMPMS: ICD-10-CM

## 2024-05-23 DIAGNOSIS — N20.0 CALCULUS OF KIDNEY: ICD-10-CM

## 2024-05-23 PROCEDURE — 99215 OFFICE O/P EST HI 40 MIN: CPT

## 2024-05-23 PROCEDURE — G2211 COMPLEX E/M VISIT ADD ON: CPT | Mod: NC,1L

## 2024-05-23 NOTE — ADDENDUM
[FreeTextEntry1] : I, Dr. Bowling, personally performed the evaluation and management (E/M) services for this established patient who presents today with (a) new problem(s)/exacerbation of (an) existing condition(s).  That E/M includes conducting the clinically appropriate interval history &/or exam, assessing all new/exacerbated conditions, and establishing a new plan of care.  Today, my DAV, Wilver Nicholson, was here to observe my evaluation and management service for this new problem/exacerbated condition and follow the plan of care established by me going forward.  The submitted E/M billing level for this visit reflects the total time spent on the day of the visit including face-to-face time spent with the patient, non-face-to-face review of medical records and relevant information, documentation, and asynchronous communication with the patient after a visit via phone, email, or patients EHR portal after the visit.  The medical records reviewed are either scanned into the chart or reviewed with the patient using a patients electronic medical records portal for patients with records not available to [facility] via electronic transmission platforms from other institutions and labs.  Time spend counseling and performing coordination of care was also included in determining the appropriate EM billing level.

## 2024-05-23 NOTE — ASSESSMENT
[FreeTextEntry1] : FABIAN DELGADO is a 49-year-old male with hx of nephrolithiasis and prior ESWL, presents for consultation for bilateral intermittent flank pain, pain with ejaculation, possible scrotal lump. Moderately tender on prostate exam. With culture confirmed bacterial prostatitis improved symptoms after two 3 week courses of Augmentin, Chronic punctate nephrolithiasis. BPH with bothersome lower urinary tract symptoms, cystoscopy shows very mild intravesical protrusion of the prostate. LUTS mildly improved on alfuzosin.  Reviewed hormone labs with patient.  He has a low bioavailable testosterone.  Also with obstructive sleep apnea which is not being managed.  I reviewed this with the patient and instructed him to follow-up with his pulmonologist to discuss different management for sleep apnea.  Patient verbalized understanding. As for pain with ejaculation, this is a chronic issue.  Pain does not last and resolves on its own quickly, fairly mild. Patient does have history of lower back pain and obesity.  Perhaps this is  musculoskeletal in origin. Patient will call office if pain with ejaculation continues or worsens. Will have patient follow-up in 3 months with men sexual health specialist, Dr. Martell with repeat hormones for consultation.  As for BPH and lower urinary tract symptoms, they are improved on alfuzosin.  Uroflow reviewed with patient today.  Patient states urinary symptoms are currently satisfactory..  Suspect nocturia will likely improve with management of obstructive sleep apnea. He declined more invasive testing/procedures.   PSA reviewed.  Repeat PSA in 1 year.  Summary -Sexual med consultation with Dr. Martell in 3 months. -Follow-up with pulmonologist for obstructive sleep apnea. -Continue alfuzosin for BPH/lower urinary tract symptoms. -Continue observation for chronic nephrolithiasis. future repeat imaging

## 2024-05-23 NOTE — HISTORY OF PRESENT ILLNESS
[FreeTextEntry1] : FABIAN DELGADO is a 49-year-old male with hx of nephrolithiasis and prior ESWL, presents for consultation for bilateral intermittent flank pain, pain with ejaculation, possible scrotal lump. Moderately tender on prostate exam. With culture confirmed bacterial prostatitis improved symptoms after two 3 week courses of Augmentin, Chronic punctate nephrolithiasis. Status post cystoscopy for lower urinary tract symptoms which showed mild intravesical protrusion of the prostate.  No bladder tumors.  No strictures.  Presents to office today for uroflow PVR.  Uroflow demonstrates a Q-Aldair of 14.0 mL/s.  An average flow rate of 4.8 mL/s.  PVR 2 cc.  Patient did do uroflow multiple times as he drank a lot of water prior to coming to office.  States that since starting alfuzosin nocturia has decreased from 4-5 times a night to 2-3 times a night.  He also has a history of obstructive sleep apnea and has not manage with CPAP machine.  States he is unable to tolerate machine.  Did not do semen analysis.  Does report continued pain with ejaculation.  States that it is a dull ache which comes on acutely and then resolves on its own.  Denies any burning sensation.  Denies any perineal pain.  Bioavailable testosterone in April 2024 was 61.2.  Total testosterone 336.  Sex hormone binding globulin of 50. PSA 0.9 ng/mL.  UA negative.  Prior UA 02/15/2024 - negative  MRI of the abdomen with and without IV contrast, lesion was 1.5 cm benign hemangioma. He is following up with PCP regarding this issue.  Urinalysis with culture/sensitivity, from 1/5/2024, is negative for any abnormalities or bacterial growth.  CT AP No Cont 11/24/2023 - On my read, no ureteral stones seen or hydronephrosis. Agree bilateral punctate nephrolithiasis. Prostate calcifications seen. IMPRESSION: Punctate nonobstructing bilateral intrarenal calculi as above; no hydronephrosis or obstructing nephroureteral calculi. Incompletely characterized 1.8 cm hypodense hepatic lesion. Further evaluation with a contrast enhanced MRI abdomen is recommended. Colonic diverticulosis without evidence for acute diverticulitis. (KIDNEYS: Punctate nonobstructing left lower pole renal calculi measuring 0.1-0.2 cm. Nonobstructing 0.2 cm right upper pole renal calculus. No obstructing nephroureteral calculi or hydronephrosis bilaterally.)  Testicle US 11/24/2023 - images independently reviewed by me IMPRESSION: No testicular or scrotal masses. Tiny right hydrocele with a volume of 2.2 mL.  UA 11/07/2023 - 0 WBCs, 2 RBCs. UCx - <10,000 CFU/ml Enterococcus faecalis <10,000 CFU/ml Escherichia coli   history: Prior episode of kidney stone over 10 years ago, likely had an ESWL. Denies recurrent UTIs. Family History: denies  malignancies. Social History: city ,  PSHx: abd hernia repair UCx - normal urogenital roxana

## 2024-06-03 ENCOUNTER — NON-APPOINTMENT (OUTPATIENT)
Age: 49
End: 2024-06-03

## 2024-07-11 ENCOUNTER — APPOINTMENT (OUTPATIENT)
Dept: ORTHOPEDIC SURGERY | Facility: CLINIC | Age: 49
End: 2024-07-11
Payer: COMMERCIAL

## 2024-07-11 DIAGNOSIS — S83.231D COMPLEX TEAR OF MEDIAL MENISCUS, CURRENT INJURY, RIGHT KNEE, SUBSEQUENT ENCOUNTER: ICD-10-CM

## 2024-07-11 PROCEDURE — 99245 OFF/OP CONSLTJ NEW/EST HI 55: CPT

## 2024-07-11 PROCEDURE — 99205 OFFICE O/P NEW HI 60 MIN: CPT

## 2024-08-08 ENCOUNTER — NON-APPOINTMENT (OUTPATIENT)
Age: 49
End: 2024-08-08

## 2024-08-09 ENCOUNTER — APPOINTMENT (OUTPATIENT)
Dept: UROLOGY | Facility: CLINIC | Age: 49
End: 2024-08-09

## 2024-08-09 ENCOUNTER — RESULT CHARGE (OUTPATIENT)
Age: 49
End: 2024-08-09

## 2024-08-09 PROBLEM — N46.9 MALE INFERTILITY: Status: ACTIVE | Noted: 2024-08-09

## 2024-08-09 PROCEDURE — G2211 COMPLEX E/M VISIT ADD ON: CPT | Mod: NC

## 2024-08-09 PROCEDURE — 99214 OFFICE O/P EST MOD 30 MIN: CPT

## 2024-08-09 NOTE — ASSESSMENT
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  - T 336, low free - Will check hormones: T, LH, FSH, Prolactin, Estradiol, CBC, Vitamin D, LFTs - SA - Scrotal US reviewed and interpreted. No varicoceles, masses, torsion or orchitis. From 11/2023 - [facility] referral sheet given to patient to share with his wife should need help with scheduling an appointment given her significant advance maternal age. Explained chances of natural pregnancy are highly unlikely, <5% - Will follow up with patient in 2-4 weeks to review labs  #UTI/LUTS - PVR 21mL - ua/ucx - complete treatment course of ciprofloxacin 500mg BID x7 days   I had a long discussion with the patient about the reasons for male infertility.   Infertility can be due to female or male factor.  Female factors are menopause, early ovarian failure, irregular menses, PCOS, anatomic abnormalities of the vagina and uterus.  With age and especially after the age of 35 there is a higher chance of spontaneous pregnancy abortions and higher rate of chromosomal abnormalities and longer time to get pregnant.   The male factor infertility are due to problems with male.  They often can be reflected in abnormal semen analysis.  Lack of sperm in the ejaculate on microscopic examination does not mean that sperm is not present in the testicle. In men with azoospermia we perform genetic evaluation including karyotype to exclude genetic reasons for male infertility.  Other than genetic, the reasons for lack of sperm in the ejaculate can be idiopathic, chemotherapy, radiation therapy and medical therapy.  If no correctable reasons for male infertility and azoospermia are found then microsurgical testicular sperm extraction is performed. Not every sperm bank or cryo bank preserves and processes sperm from testicular biopsies.  If sperm is present in the ejaculate typically there may be a correctable reasons for male infertility like low testosterone, elevated prolactin, varicocele, partial ejaculatory duct obstruction and infection in seminal vesicles and prostate. In such situations the work-up consists of scrotal ultrasound if physical examination is not revealing or difficult to perform. Varicose veins of the scrotum have been shown to be frequent cause of secondary and primary infertility.  Repair of varicocele can restore and improve sperm production in majority of men if varicocele is large. Guidelines do not recommend repair of small subclinical varicoceles as current literature shows no demonstrable benefit to semen parameters or pregnancy rates.   Full hormonal evaluation is performed including testosterone, FSH, LH, prolactin, estradiol and others as needed.    I have also discussed with the patient that typically we recommend sometimes repeat semen analysis as the results of semen analysis in a single man can vary dramatically. For ex. if patient had history of febrile disease within 2 to 3 months from semen analysis it is likely that acute stress negatively affected sperm quality.  The potential need for IVF/ICSI was also explained. Need to cryopreserve specimen was explained. Option of using sperm donor and adoption were also reviewed. Considering the wife's age using sperm donor as a backup if sperm is not found during testicular sperm extraction is feasible option.  Adoption is also another option should all medical/surgical interventions fail. Emotional support was provided, and all questions/concerns were addressed.    Patient will follow-up with me to review his results and plan next steps accordingly.

## 2024-08-09 NOTE — PHYSICAL EXAM
[Dear  ___] : Dear  [unfilled], [Consult Letter:] : I had the pleasure of evaluating your patient, [unfilled]. [Costovertebral Angle Tenderness] : no ~M costovertebral angle tenderness [Consult Closing:] : Thank you very much for allowing me to participate in the care of this patient.  If you have any questions, please do not hesitate to contact me. [Please see my note below.] : Please see my note below. [Sincerely,] : Sincerely, [FreeTextEntry3] : Piotr Díaz M.D., F.A.C.S, F.A.S.C.R.S

## 2024-08-09 NOTE — HISTORY OF PRESENT ILLNESS
[FreeTextEntry1] : FABIAN DELGADO is a 49-year-old male with hx of nephrolithiasis S/P ESWL, follows with Dr. 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 He and his partner have tried to conceive for over 10 years with no success. He has no bothersome issues with his erections. He reports no problems with his ejaculatory function, recently had pain upon ejaculation but this has since resolved after he was started on ciprofloxacin for a presumed UTI a couple of days ago. Also reports passing a "kidney stone" a few days ago when he was having vague flank pain which then immediately resolved. Today he denies having any fevers, chills, nausea, vomiting, flank/abdominal pain or any irritative voiding symptoms. No hx of anabolic steroid use, chemotherapy, testosterone replacement, prior trauma to genitalia, or chromosomal/genetic mutations. No known family hx of infertility.   His current partner is 46 years old. Pts partner has never been seen by an [facility] specialist. Pt still has been unable to obtain a semen analysis.   Labs Bioavailable testosterone in April 2024 was 61.2. Total testosterone 336. Sex hormone binding globulin of 50. PSA 0.9 ng/mL. UA negative.   history: Prior episode of kidney stone over 10 years ago, likely had an ESWL. Denies recurrent UTIs. Family History: denies  malignancies. Social History: city ,  PSHx: abd hernia repair UCx - normal urogenital roxana  Testicle US 11/24/2023 - images independently reviewed by me IMPRESSION: No testicular or scrotal masses. Tiny right hydrocele with a volume of 2.2 mL.

## 2024-08-09 NOTE — PLAN

## 2024-09-20 ENCOUNTER — APPOINTMENT (OUTPATIENT)
Dept: UROLOGY | Facility: CLINIC | Age: 49
End: 2024-09-20

## 2024-11-01 ENCOUNTER — EMERGENCY (EMERGENCY)
Facility: HOSPITAL | Age: 49
LOS: 0 days | Discharge: HOME | End: 2024-11-01
Attending: EMERGENCY MEDICINE
Payer: COMMERCIAL

## 2024-11-01 VITALS — SYSTOLIC BLOOD PRESSURE: 136 MMHG | DIASTOLIC BLOOD PRESSURE: 74 MMHG | TEMPERATURE: 98 F | HEART RATE: 83 BPM

## 2024-11-01 VITALS
SYSTOLIC BLOOD PRESSURE: 133 MMHG | RESPIRATION RATE: 18 BRPM | TEMPERATURE: 96 F | HEART RATE: 80 BPM | OXYGEN SATURATION: 100 % | WEIGHT: 279.99 LBS | DIASTOLIC BLOOD PRESSURE: 70 MMHG

## 2024-11-01 DIAGNOSIS — R11.2 NAUSEA WITH VOMITING, UNSPECIFIED: ICD-10-CM

## 2024-11-01 DIAGNOSIS — R42 DIZZINESS AND GIDDINESS: ICD-10-CM

## 2024-11-01 DIAGNOSIS — K21.9 GASTRO-ESOPHAGEAL REFLUX DISEASE WITHOUT ESOPHAGITIS: ICD-10-CM

## 2024-11-01 DIAGNOSIS — G47.33 OBSTRUCTIVE SLEEP APNEA (ADULT) (PEDIATRIC): ICD-10-CM

## 2024-11-01 DIAGNOSIS — I10 ESSENTIAL (PRIMARY) HYPERTENSION: ICD-10-CM

## 2024-11-01 DIAGNOSIS — E78.5 HYPERLIPIDEMIA, UNSPECIFIED: ICD-10-CM

## 2024-11-01 DIAGNOSIS — R07.89 OTHER CHEST PAIN: ICD-10-CM

## 2024-11-01 DIAGNOSIS — R19.7 DIARRHEA, UNSPECIFIED: ICD-10-CM

## 2024-11-01 DIAGNOSIS — R61 GENERALIZED HYPERHIDROSIS: ICD-10-CM

## 2024-11-01 DIAGNOSIS — R94.31 ABNORMAL ELECTROCARDIOGRAM [ECG] [EKG]: ICD-10-CM

## 2024-11-01 DIAGNOSIS — J45.909 UNSPECIFIED ASTHMA, UNCOMPLICATED: ICD-10-CM

## 2024-11-01 DIAGNOSIS — Z98.890 OTHER SPECIFIED POSTPROCEDURAL STATES: Chronic | ICD-10-CM

## 2024-11-01 DIAGNOSIS — Z88.5 ALLERGY STATUS TO NARCOTIC AGENT: ICD-10-CM

## 2024-11-01 DIAGNOSIS — R10.816 EPIGASTRIC ABDOMINAL TENDERNESS: ICD-10-CM

## 2024-11-01 LAB
ALBUMIN SERPL ELPH-MCNC: 4.2 G/DL — SIGNIFICANT CHANGE UP (ref 3.5–5.2)
ALP SERPL-CCNC: 98 U/L — SIGNIFICANT CHANGE UP (ref 30–115)
ALT FLD-CCNC: 31 U/L — SIGNIFICANT CHANGE UP (ref 0–41)
ANION GAP SERPL CALC-SCNC: 15 MMOL/L — HIGH (ref 7–14)
AST SERPL-CCNC: 24 U/L — SIGNIFICANT CHANGE UP (ref 0–41)
BASOPHILS # BLD AUTO: 0.07 K/UL — SIGNIFICANT CHANGE UP (ref 0–0.2)
BASOPHILS NFR BLD AUTO: 0.9 % — SIGNIFICANT CHANGE UP (ref 0–1)
BILIRUB SERPL-MCNC: 0.3 MG/DL — SIGNIFICANT CHANGE UP (ref 0.2–1.2)
BUN SERPL-MCNC: 24 MG/DL — HIGH (ref 10–20)
CALCIUM SERPL-MCNC: 9.2 MG/DL — SIGNIFICANT CHANGE UP (ref 8.4–10.5)
CHLORIDE SERPL-SCNC: 105 MMOL/L — SIGNIFICANT CHANGE UP (ref 98–110)
CO2 SERPL-SCNC: 22 MMOL/L — SIGNIFICANT CHANGE UP (ref 17–32)
CREAT SERPL-MCNC: 1.1 MG/DL — SIGNIFICANT CHANGE UP (ref 0.7–1.5)
EGFR: 82 ML/MIN/1.73M2 — SIGNIFICANT CHANGE UP
EOSINOPHIL # BLD AUTO: 0.26 K/UL — SIGNIFICANT CHANGE UP (ref 0–0.7)
EOSINOPHIL NFR BLD AUTO: 3.2 % — SIGNIFICANT CHANGE UP (ref 0–8)
GLUCOSE SERPL-MCNC: 126 MG/DL — HIGH (ref 70–99)
HCT VFR BLD CALC: 41.6 % — LOW (ref 42–52)
HGB BLD-MCNC: 14.3 G/DL — SIGNIFICANT CHANGE UP (ref 14–18)
IMM GRANULOCYTES NFR BLD AUTO: 0.5 % — HIGH (ref 0.1–0.3)
LIDOCAIN IGE QN: 15 U/L — SIGNIFICANT CHANGE UP (ref 7–60)
LYMPHOCYTES # BLD AUTO: 1.43 K/UL — SIGNIFICANT CHANGE UP (ref 1.2–3.4)
LYMPHOCYTES # BLD AUTO: 17.7 % — LOW (ref 20.5–51.1)
MCHC RBC-ENTMCNC: 29.9 PG — SIGNIFICANT CHANGE UP (ref 27–31)
MCHC RBC-ENTMCNC: 34.4 G/DL — SIGNIFICANT CHANGE UP (ref 32–37)
MCV RBC AUTO: 87 FL — SIGNIFICANT CHANGE UP (ref 80–94)
MONOCYTES # BLD AUTO: 0.73 K/UL — HIGH (ref 0.1–0.6)
MONOCYTES NFR BLD AUTO: 9.1 % — SIGNIFICANT CHANGE UP (ref 1.7–9.3)
NEUTROPHILS # BLD AUTO: 5.53 K/UL — SIGNIFICANT CHANGE UP (ref 1.4–6.5)
NEUTROPHILS NFR BLD AUTO: 68.6 % — SIGNIFICANT CHANGE UP (ref 42.2–75.2)
NRBC # BLD: 0 /100 WBCS — SIGNIFICANT CHANGE UP (ref 0–0)
PLATELET # BLD AUTO: 215 K/UL — SIGNIFICANT CHANGE UP (ref 130–400)
PMV BLD: 10.3 FL — SIGNIFICANT CHANGE UP (ref 7.4–10.4)
POTASSIUM SERPL-MCNC: 3.6 MMOL/L — SIGNIFICANT CHANGE UP (ref 3.5–5)
POTASSIUM SERPL-SCNC: 3.6 MMOL/L — SIGNIFICANT CHANGE UP (ref 3.5–5)
PROT SERPL-MCNC: 6.5 G/DL — SIGNIFICANT CHANGE UP (ref 6–8)
RBC # BLD: 4.78 M/UL — SIGNIFICANT CHANGE UP (ref 4.7–6.1)
RBC # FLD: 13 % — SIGNIFICANT CHANGE UP (ref 11.5–14.5)
SODIUM SERPL-SCNC: 142 MMOL/L — SIGNIFICANT CHANGE UP (ref 135–146)
TROPONIN T, HIGH SENSITIVITY RESULT: <6 NG/L — SIGNIFICANT CHANGE UP (ref 6–21)
TROPONIN T, HIGH SENSITIVITY RESULT: <6 NG/L — SIGNIFICANT CHANGE UP (ref 6–21)
WBC # BLD: 8.06 K/UL — SIGNIFICANT CHANGE UP (ref 4.8–10.8)
WBC # FLD AUTO: 8.06 K/UL — SIGNIFICANT CHANGE UP (ref 4.8–10.8)

## 2024-11-01 PROCEDURE — 85025 COMPLETE CBC W/AUTO DIFF WBC: CPT

## 2024-11-01 PROCEDURE — 93005 ELECTROCARDIOGRAM TRACING: CPT

## 2024-11-01 PROCEDURE — 93010 ELECTROCARDIOGRAM REPORT: CPT

## 2024-11-01 PROCEDURE — 82962 GLUCOSE BLOOD TEST: CPT

## 2024-11-01 PROCEDURE — 96374 THER/PROPH/DIAG INJ IV PUSH: CPT | Mod: XU

## 2024-11-01 PROCEDURE — 83690 ASSAY OF LIPASE: CPT

## 2024-11-01 PROCEDURE — 99285 EMERGENCY DEPT VISIT HI MDM: CPT

## 2024-11-01 PROCEDURE — 71045 X-RAY EXAM CHEST 1 VIEW: CPT | Mod: 26

## 2024-11-01 PROCEDURE — 96375 TX/PRO/DX INJ NEW DRUG ADDON: CPT

## 2024-11-01 PROCEDURE — 74177 CT ABD & PELVIS W/CONTRAST: CPT | Mod: 26,MC

## 2024-11-01 PROCEDURE — 84484 ASSAY OF TROPONIN QUANT: CPT

## 2024-11-01 PROCEDURE — 71045 X-RAY EXAM CHEST 1 VIEW: CPT

## 2024-11-01 PROCEDURE — 80053 COMPREHEN METABOLIC PANEL: CPT

## 2024-11-01 PROCEDURE — 99285 EMERGENCY DEPT VISIT HI MDM: CPT | Mod: 25

## 2024-11-01 PROCEDURE — 36415 COLL VENOUS BLD VENIPUNCTURE: CPT

## 2024-11-01 PROCEDURE — 74177 CT ABD & PELVIS W/CONTRAST: CPT | Mod: MC

## 2024-11-01 RX ORDER — FAMOTIDINE 40 MG
20 TABLET ORAL ONCE
Refills: 0 | Status: COMPLETED | OUTPATIENT
Start: 2024-11-01 | End: 2024-11-01

## 2024-11-01 RX ORDER — MECLIZINE HYDROCLORIDE 25 MG/1
1 TABLET ORAL
Qty: 30 | Refills: 0
Start: 2024-11-01 | End: 2024-11-07

## 2024-11-01 RX ORDER — ASPIRIN 325 MG
243 TABLET ORAL ONCE
Refills: 0 | Status: COMPLETED | OUTPATIENT
Start: 2024-11-01 | End: 2024-11-01

## 2024-11-01 RX ORDER — METOCLOPRAMIDE HCL 5 MG
10 TABLET ORAL ONCE
Refills: 0 | Status: COMPLETED | OUTPATIENT
Start: 2024-11-01 | End: 2024-11-01

## 2024-11-01 RX ORDER — SODIUM CHLORIDE IRRIG SOLUTION 0.9 %
1000 SOLUTION, IRRIGATION IRRIGATION ONCE
Refills: 0 | Status: COMPLETED | OUTPATIENT
Start: 2024-11-01 | End: 2024-11-01

## 2024-11-01 RX ORDER — MECLIZINE HYDROCLORIDE 25 MG/1
25 TABLET ORAL ONCE
Refills: 0 | Status: COMPLETED | OUTPATIENT
Start: 2024-11-01 | End: 2024-11-01

## 2024-11-01 RX ORDER — ONDANSETRON HCL/PF 4 MG/2 ML
1 VIAL (ML) INJECTION
Qty: 9 | Refills: 0
Start: 2024-11-01 | End: 2024-11-03

## 2024-11-01 RX ADMIN — Medication 1000 MILLILITER(S): at 20:44

## 2024-11-01 RX ADMIN — MECLIZINE HYDROCLORIDE 25 MILLIGRAM(S): 25 TABLET ORAL at 20:43

## 2024-11-01 RX ADMIN — Medication 1000 MILLILITER(S): at 21:32

## 2024-11-01 RX ADMIN — Medication 20 MILLIGRAM(S): at 20:42

## 2024-11-01 RX ADMIN — Medication 243 MILLIGRAM(S): at 20:42

## 2024-11-01 RX ADMIN — Medication 10 MILLIGRAM(S): at 20:42

## 2024-11-01 NOTE — ED PROVIDER NOTE - ATTENDING CONTRIBUTION TO CARE
49-year-old male past med history of hypertension, hyperlipidemia, asthma presents with multiple symptoms.  Patient reports that yesterday he started to have episodes of diarrhea last night.  On that resolved and was okay during the day.  1 hour prior to arrival started to develop nausea vomiting sharp left chest pain.  Also was diaphoretic and dizzy at that time.  EMS gave patient 8 mg of Zofran.  Also abnormal EKG. patient still feeling dizzy and nauseous.  Chest pain is resolved.    CONSTITUTIONAL: Well-developed; well-nourished; in no acute distress.   SKIN: warm, dry  HEAD: Normocephalic; atraumatic.  EYES: PERRL, EOMI, no conjunctival erythema  ENT: No nasal discharge; airway clear.  NECK: Supple; non tender.  CARD: S1, S2 normal;  Regular rate and rhythm.   RESP: No wheezes, rales or rhonchi.  ABD: soft positive epigastric tenderness, non distended, no rebound or guarding  EXT: Normal ROM.  5/5 strength in all 4 extremities   LYMPH: No acute cervical adenopathy.  NEURO: Alert, oriented, grossly unremarkable. neurovascularly intact  PSYCH: Cooperative, appropriate.

## 2024-11-01 NOTE — ED PROVIDER NOTE - CLINICAL SUMMARY MEDICAL DECISION MAKING FREE TEXT BOX
Patient presents with left-sided chest pain.  Positive epigastric tenderness on exam.  Labs CT EKG x-ray done.  No acute findings.  Troponin negative x 2.  Patient feeling much better after fluids and medications.  All results discussed.  Patient discharged with PMD follow-up and return precautions.

## 2024-11-01 NOTE — ED ADULT NURSE NOTE - NSFALLUNIVINTERV_ED_ALL_ED
Bed/Stretcher in lowest position, wheels locked, appropriate side rails in place/Call bell, personal items and telephone in reach/Instruct patient to call for assistance before getting out of bed/chair/stretcher/Non-slip footwear applied when patient is off stretcher/English to call system/Physically safe environment - no spills, clutter or unnecessary equipment/Purposeful proactive rounding/Room/bathroom lighting operational, light cord in reach

## 2024-11-01 NOTE — ED PROVIDER NOTE - AXIS
30'x1, 40'x1 pt required standing rest break between trials, spO2 87%. increased to 92% with 4L O2 and 2-3 min rest with PLB, returned to 3L O2 NC SpO2 >95% with sitting post ambulation, LILY Vo aware
Normal

## 2024-11-01 NOTE — ED PROVIDER NOTE - OBJECTIVE STATEMENT
Patient is a 49 year old male with PMH of HTN, HLD, GERD, KYLIE, and Asthma presenting for chest pain. Patient reports a sudden onset of dizziness (sensation of room spinning) as well as brief episode of left-sided non-radiating sharp chest discomfort that has since subsided, followed by nausea and a few episodes of non-bloody, non-bilious vomiting. Patient states symptoms began one hour prior to arrival to the ER; given 8mg of Zofran by EMS prior to arrival. Patient also reports non-bloody diarrhea yesterday. Patient denies back pain, fevers, cough, congestion, shortness of breath, urinary symptoms, calf pain/tenderness, recent sick contacts, or additional complaints.

## 2024-11-01 NOTE — ED PROVIDER NOTE - PROGRESS NOTE DETAILS
SO: Patient  reports feeling better; denies any active chest pain or nausea. Patient was able to tolerate PO.    Patient has a Cardiology appointment scheduled with Judy on 10/20

## 2024-11-01 NOTE — ED ADULT TRIAGE NOTE - TEMPERATURE IN FAHRENHEIT (DEGREES F)
Addendum  created 06/30/22 0638 by Bam Hamlin MD    Charge Capture section accepted, Cosign clinical note with attestation       95.7

## 2024-11-01 NOTE — ED PROVIDER NOTE - PHYSICAL EXAMINATION
VITAL SIGNS: I have reviewed nursing notes and confirm.  CONSTITUTIONAL: ill-appeairng; Non-toxic, in NAD  SKIN: cool and dry, normal skin turgor  HEAD: NCAT  EYES: No conjunctival injection, scleral anicteric  ENT: Moist mucous membranes, normal pharynx with no erythema or exudates  NECK: Supple; full ROM. Nontender. No cervical LAD  CARD: RRR, no murmurs, rubs or gallops  RESP: Clear to ausculation bilaterally.  No rales, rhonchi, or wheezing.  ABD: Distended, epigastric-tenderness, no rebound or guarding. No CVA tenderness  EXT: Full ROM,  NEURO: Normal motor, normal sensory.

## 2024-11-01 NOTE — ED PROVIDER NOTE - NSFOLLOWUPINSTRUCTIONS_ED_ALL_ED_FT
Chest Pain    Chest pain can be caused by many different conditions which may or may not be dangerous. Causes include heartburn, lung infections, heart attack, blood clot in lungs, skin infections, strain or damage to muscle, cartilage, or bones, etc. Lab tests or other studies including an electrocardiogram (EKG) may have been performed to find the cause of your pain. Make sure to follow up with a cardiologist or as instructed by your health care professional.    SEEK IMMEDIATE MEDICAL CARE IF YOU HAVE THE FOLLOWING SYMPTOMS: worsening chest pain, coughing up blood, unexplained back/neck/jaw pain, severe abdominal pain, dizziness or lightheadedness, shortness of breath, sweaty or clammy skin, vomiting, or racing heart beat. These symptoms may represent a serious problem that is an emergency. Do not wait to see if the symptoms will go away. Get medical help right away. Call your local emergency services (911 in the [place] Do not drive yourself to the hospital.      Acute Nausea and Vomiting    WHAT YOU NEED TO KNOW:  Acute nausea and vomiting start suddenly, worsen quickly, and last a short time.    DISCHARGE INSTRUCTIONS:    Return to the emergency department if:   You see blood in your vomit or your bowel movements.  You have sudden, severe pain in your chest and upper abdomen after hard vomiting or retching.  You have swelling in your neck and chest.   You are dizzy, cold, and thirsty and your eyes and mouth are dry.  You are urinating very little or not at all.  You have muscle weakness, leg cramps, and trouble breathing.   Your heart is beating much faster than normal.       You continue to vomit for more than 48 hours.     Contact your healthcare provider if:   You have frequent dry heaves (vomiting but nothing comes out).  Your nausea and vomiting does not get better or go away after you use medicine.  You have questions or concerns about your condition or treatment.    Medicines: You may need any of the following:   Medicines may be given to calm your stomach and stop your vomiting. You may also need medicines to help you feel more relaxed or to stop nausea and vomiting caused by motion sickness.  Gastrointestinal stimulants are used to help empty your stomach and bowels. This may help decrease nausea and vomiting.  Take your medicine as directed. Contact your healthcare provider if you think your medicine is not helping or if you have side effects. Tell him or her if you are allergic to any medicine. Keep a list of the medicines, vitamins, and herbs you take. Include the amounts, and when and why you take them. Bring the list or the pill bottles to follow-up visits. Carry your medicine list with you in case of an emergency.    Prevent or manage acute nausea and vomiting:   Do not drink alcohol. Alcohol may upset or irritate your stomach. Too much alcohol can also cause acute nausea and vomiting.  Control stress. Headaches due to stress may cause nausea and vomiting. Find ways to relax and manage your stress. Get more rest and sleep  Drink more liquids as directed. Vomiting can lead to dehydration. It is important to drink more liquids to help replace lost body fluids. Ask your healthcare provider how much liquid to drink each day and which liquids are best for you. Your provider may recommend that you drink an oral rehydration solution (ORS). ORS contains water, salts, and sugar that are needed to replace the lost body fluids. Ask what kind of ORS to use, how much to drink, and where to get it.  Eat smaller meals, more often. Eat small amounts of food every 2 to 3 hours, even if you are not hungry. Food in your stomach may decrease your nausea.  Talk to your healthcare provider before you take over-the-counter (OTC) medicines. These medicines can cause serious problems if you use certain other medicines, or you have a medical condition. You may have problems if you use too much or use them for longer than the label says. Follow directions on the label carefully.     Follow up with your healthcare provider as directed: Write down your questions so you remember to ask them during your follow-up visits.

## 2024-11-01 NOTE — ED PROVIDER NOTE - CARE PLAN
1 Principal Discharge DX:	Nausea and vomiting  Secondary Diagnosis:	Vertigo  Secondary Diagnosis:	Chest pain

## 2024-11-01 NOTE — ED PROVIDER NOTE - PATIENT PORTAL LINK FT
You can access the FollowMyHealth Patient Portal offered by [facility] by registering at the following website: [email or web address]. By joining [employer]’s FollowMyHealth portal, you will also be able to view your health information using other applications (apps) compatible with our system.

## 2025-01-06 RX ORDER — FLUTICASONE FUROATE AND VILANTEROL TRIFENATATE 100; 25 UG/1; UG/1
100-25 POWDER RESPIRATORY (INHALATION)
Qty: 3 | Refills: 0 | Status: ACTIVE | COMMUNITY
Start: 1900-01-01 | End: 1900-01-01

## 2025-01-09 RX ORDER — FLUTICASONE FUROATE AND VILANTEROL 100; 25 UG/1; UG/1
100-25 POWDER RESPIRATORY (INHALATION)
Qty: 60 | Refills: 3 | Status: ACTIVE | COMMUNITY
Start: 2025-01-09 | End: 1900-01-01

## 2025-01-17 ENCOUNTER — APPOINTMENT (OUTPATIENT)
Dept: PULMONOLOGY | Facility: CLINIC | Age: 50
End: 2025-01-17
Payer: COMMERCIAL

## 2025-01-17 VITALS
BODY MASS INDEX: 42.37 KG/M2 | WEIGHT: 296 LBS | HEART RATE: 104 BPM | DIASTOLIC BLOOD PRESSURE: 82 MMHG | SYSTOLIC BLOOD PRESSURE: 138 MMHG | HEIGHT: 70 IN | OXYGEN SATURATION: 96 %

## 2025-01-17 DIAGNOSIS — R91.1 SOLITARY PULMONARY NODULE: ICD-10-CM

## 2025-01-17 DIAGNOSIS — J45.909 UNSPECIFIED ASTHMA, UNCOMPLICATED: ICD-10-CM

## 2025-01-17 DIAGNOSIS — G47.33 OBSTRUCTIVE SLEEP APNEA (ADULT) (PEDIATRIC): ICD-10-CM

## 2025-01-17 PROCEDURE — 99214 OFFICE O/P EST MOD 30 MIN: CPT

## 2025-01-17 PROCEDURE — G2211 COMPLEX E/M VISIT ADD ON: CPT | Mod: NC

## 2025-01-17 RX ORDER — BUDESONIDE AND FORMOTEROL FUMARATE DIHYDRATE 160; 4.5 UG/1; UG/1
160-4.5 AEROSOL RESPIRATORY (INHALATION) TWICE DAILY
Qty: 1 | Refills: 2 | Status: ACTIVE | COMMUNITY
Start: 2025-01-17 | End: 1900-01-01

## 2025-01-24 ENCOUNTER — APPOINTMENT (OUTPATIENT)
Dept: PULMONOLOGY | Facility: HOSPITAL | Age: 50
End: 2025-01-24
Payer: COMMERCIAL

## 2025-01-24 ENCOUNTER — OUTPATIENT (OUTPATIENT)
Dept: OUTPATIENT SERVICES | Facility: HOSPITAL | Age: 50
LOS: 1 days | End: 2025-01-24
Payer: COMMERCIAL

## 2025-01-24 DIAGNOSIS — Z98.890 OTHER SPECIFIED POSTPROCEDURAL STATES: Chronic | ICD-10-CM

## 2025-01-24 DIAGNOSIS — R06.02 SHORTNESS OF BREATH: ICD-10-CM

## 2025-01-24 PROCEDURE — 94729 DIFFUSING CAPACITY: CPT | Mod: 26

## 2025-01-24 PROCEDURE — 94664 DEMO&/EVAL PT USE INHALER: CPT

## 2025-01-24 PROCEDURE — 94060 EVALUATION OF WHEEZING: CPT | Mod: 26

## 2025-01-24 PROCEDURE — 94729 DIFFUSING CAPACITY: CPT

## 2025-01-24 PROCEDURE — 94727 GAS DIL/WSHOT DETER LNG VOL: CPT

## 2025-01-24 PROCEDURE — 94727 GAS DIL/WSHOT DETER LNG VOL: CPT | Mod: 26

## 2025-01-24 PROCEDURE — 94070 EVALUATION OF WHEEZING: CPT

## 2025-01-24 RX ORDER — BUDESONIDE AND FORMOTEROL FUMARATE DIHYDRATE 160; 4.5 UG/1; UG/1
160-4.5 AEROSOL RESPIRATORY (INHALATION) TWICE DAILY
Qty: 1 | Refills: 3 | Status: ACTIVE | COMMUNITY
Start: 1900-01-01 | End: 1900-01-01

## 2025-01-25 DIAGNOSIS — R06.02 SHORTNESS OF BREATH: ICD-10-CM

## 2025-03-21 ENCOUNTER — RX RENEWAL (OUTPATIENT)
Age: 50
End: 2025-03-21

## 2025-05-09 ENCOUNTER — APPOINTMENT (OUTPATIENT)
Dept: PULMONOLOGY | Facility: CLINIC | Age: 50
End: 2025-05-09
Payer: COMMERCIAL

## 2025-05-09 VITALS
HEART RATE: 103 BPM | WEIGHT: 300 LBS | OXYGEN SATURATION: 98 % | RESPIRATION RATE: 15 BRPM | DIASTOLIC BLOOD PRESSURE: 96 MMHG | SYSTOLIC BLOOD PRESSURE: 124 MMHG | BODY MASS INDEX: 43.05 KG/M2

## 2025-05-09 DIAGNOSIS — R91.1 SOLITARY PULMONARY NODULE: ICD-10-CM

## 2025-05-09 DIAGNOSIS — J45.909 UNSPECIFIED ASTHMA, UNCOMPLICATED: ICD-10-CM

## 2025-05-09 DIAGNOSIS — G47.33 OBSTRUCTIVE SLEEP APNEA (ADULT) (PEDIATRIC): ICD-10-CM

## 2025-05-09 PROCEDURE — G2211 COMPLEX E/M VISIT ADD ON: CPT | Mod: NC

## 2025-05-09 PROCEDURE — 99214 OFFICE O/P EST MOD 30 MIN: CPT

## 2025-05-09 RX ORDER — AZELASTINE HYDROCHLORIDE 137 UG/1
0.1 SPRAY, METERED NASAL
Qty: 1 | Refills: 3 | Status: ACTIVE | COMMUNITY
Start: 2025-05-09 | End: 1900-01-01

## 2025-05-09 RX ORDER — PREDNISONE 20 MG/1
20 TABLET ORAL
Qty: 9 | Refills: 0 | Status: ACTIVE | COMMUNITY
Start: 2025-05-09 | End: 1900-01-01

## 2025-08-25 ENCOUNTER — RX RENEWAL (OUTPATIENT)
Age: 50
End: 2025-08-25

## 2025-08-25 RX ORDER — AZELASTINE HYDROCHLORIDE 137 UG/1
137 SPRAY, METERED NASAL
Qty: 30 | Refills: 0 | Status: ACTIVE | COMMUNITY
Start: 2025-08-25 | End: 1900-01-01

## 2025-09-01 ENCOUNTER — EMERGENCY (EMERGENCY)
Facility: HOSPITAL | Age: 50
LOS: 0 days | Discharge: HOME | End: 2025-09-01
Attending: EMERGENCY MEDICINE
Payer: COMMERCIAL

## 2025-09-01 VITALS
SYSTOLIC BLOOD PRESSURE: 128 MMHG | DIASTOLIC BLOOD PRESSURE: 81 MMHG | RESPIRATION RATE: 16 BRPM | TEMPERATURE: 98 F | OXYGEN SATURATION: 99 % | HEART RATE: 89 BPM

## 2025-09-01 DIAGNOSIS — T15.91XA FOREIGN BODY ON EXTERNAL EYE, PART UNSPECIFIED, RIGHT EYE, INITIAL ENCOUNTER: ICD-10-CM
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
## 2025-09-01 DIAGNOSIS — Z98.890 OTHER SPECIFIED POSTPROCEDURAL STATES: Chronic | ICD-10-CM

## 2025-09-01 PROCEDURE — 99284 EMERGENCY DEPT VISIT MOD MDM: CPT | Mod: 25

## 2025-09-01 PROCEDURE — 99283 EMERGENCY DEPT VISIT LOW MDM: CPT | Mod: 25

## 2025-09-01 PROCEDURE — 65220 REMOVE FOREIGN BODY FROM EYE: CPT | Mod: RT

## 2025-09-01 PROCEDURE — 65210 REMOVE FOREIGN BODY FROM EYE: CPT | Mod: RT

## 2025-09-01 RX ORDER — POLYMYXIN B SULFATE AND TRIMETHOPRIM SULFATE 10000; 1 [USP'U]/ML; MG/ML
1 SOLUTION/ DROPS OPHTHALMIC ONCE
Refills: 0 | Status: COMPLETED | OUTPATIENT
Start: 2025-09-01 | End: 2025-09-01

## 2025-09-01 RX ORDER — TETRACAINE HYDROCHLORIDE 5 MG/ML
1 SOLUTION OPHTHALMIC ONCE
Refills: 0 | Status: DISCONTINUED | OUTPATIENT
Start: 2025-09-01 | End: 2025-09-01

## 2025-09-01 RX ADMIN — POLYMYXIN B SULFATE AND TRIMETHOPRIM SULFATE 1 DROP(S): 10000; 1 SOLUTION/ DROPS OPHTHALMIC at 16:39
